# Patient Record
Sex: FEMALE | Race: WHITE | NOT HISPANIC OR LATINO
[De-identification: names, ages, dates, MRNs, and addresses within clinical notes are randomized per-mention and may not be internally consistent; named-entity substitution may affect disease eponyms.]

---

## 2019-01-09 PROBLEM — Z00.00 ENCOUNTER FOR PREVENTIVE HEALTH EXAMINATION: Status: ACTIVE | Noted: 2019-01-09

## 2019-02-01 ENCOUNTER — RECORD ABSTRACTING (OUTPATIENT)
Age: 84
End: 2019-02-01

## 2019-02-01 DIAGNOSIS — Z87.81 PERSONAL HISTORY OF (HEALED) TRAUMATIC FRACTURE: ICD-10-CM

## 2019-02-01 DIAGNOSIS — Z86.19 PERSONAL HISTORY OF OTHER INFECTIOUS AND PARASITIC DISEASES: ICD-10-CM

## 2019-02-08 ENCOUNTER — APPOINTMENT (OUTPATIENT)
Dept: GERIATRICS | Facility: HOME HEALTH | Age: 84
End: 2019-02-08
Payer: MEDICARE

## 2019-02-08 VITALS
SYSTOLIC BLOOD PRESSURE: 140 MMHG | BODY MASS INDEX: 25.66 KG/M2 | HEIGHT: 65 IN | DIASTOLIC BLOOD PRESSURE: 80 MMHG | WEIGHT: 154 LBS | HEART RATE: 85 BPM | OXYGEN SATURATION: 100 %

## 2019-02-08 DIAGNOSIS — M19.049 PRIMARY OSTEOARTHRITIS, UNSPECIFIED HAND: ICD-10-CM

## 2019-02-08 PROCEDURE — 99349 HOME/RES VST EST MOD MDM 40: CPT

## 2019-02-08 NOTE — CURRENT MEDS
[Medication Reconciliation Completed] : Medication reconciliation completed [Reports Changes In Medication (including OTC)] : Patient reports no changes in medication [] : does not miss any medication doses

## 2019-02-08 NOTE — PHYSICAL EXAM
[General Appearance - Alert] : alert [General Appearance - In No Acute Distress] : in no acute distress [General Appearance - Well Nourished] : well nourished [General Appearance - Well Developed] : well developed [General Appearance - Well-Appearing] : healthy appearing [] : normal voice and communication [Sclera] : the sclera and conjunctiva were normal [PERRL With Normal Accommodation] : pupils were equal in size, round, and reactive to light [Extraocular Movements] : extraocular movements were intact [Neck Appearance] : the appearance of the neck was normal [Heart Sounds] : normal S1 and S2 [Heart Sounds Gallop] : no gallops [Murmurs] : no murmurs [Heart Sounds Pericardial Friction Rub] : no pericardial rub [Abnormal Walk] : normal gait [Nail Clubbing] : no clubbing  or cyanosis of the fingernails [Musculoskeletal - Swelling] : no joint swelling seen [Motor Tone] : muscle strength and tone were normal [Sensation] : the sensory exam was normal to light touch and pinprick [No Focal Deficits] : no focal deficits [FreeTextEntry1] : oa hands

## 2019-02-08 NOTE — REVIEW OF SYSTEMS
[As Noted in HPI] : as noted in HPI [Negative] : Psychiatric [FreeTextEntry6] : short of breath on stairs

## 2019-04-05 ENCOUNTER — APPOINTMENT (OUTPATIENT)
Dept: GERIATRICS | Facility: HOME HEALTH | Age: 84
End: 2019-04-05
Payer: MEDICARE

## 2019-05-31 ENCOUNTER — APPOINTMENT (OUTPATIENT)
Dept: GERIATRICS | Facility: HOME HEALTH | Age: 84
End: 2019-05-31
Payer: MEDICARE

## 2019-05-31 VITALS
HEART RATE: 80 BPM | BODY MASS INDEX: 25.73 KG/M2 | WEIGHT: 154.6 LBS | SYSTOLIC BLOOD PRESSURE: 130 MMHG | DIASTOLIC BLOOD PRESSURE: 80 MMHG | OXYGEN SATURATION: 100 %

## 2019-05-31 PROCEDURE — 99348 HOME/RES VST EST LOW MDM 30: CPT

## 2019-05-31 NOTE — HISTORY OF PRESENT ILLNESS
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\par afib, htn\par varicose veins\par c/o decreased hearing\par \par 5/31/19\par f/up\par had labs 3/2019 wnl

## 2019-05-31 NOTE — ASSESSMENT
[FreeTextEntry1] : pt with htn, afib\par to get hearing checked\par doing well\par \par 5/31/19\par no changes made

## 2019-05-31 NOTE — REVIEW OF SYSTEMS
[Negative] : Endocrine [FreeTextEntry5] : varicose veins hurt at times [FreeTextEntry8] : frequent urination

## 2019-07-26 ENCOUNTER — APPOINTMENT (OUTPATIENT)
Dept: GERIATRICS | Facility: HOME HEALTH | Age: 84
End: 2019-07-26
Payer: MEDICARE

## 2019-07-26 VITALS — BODY MASS INDEX: 25.53 KG/M2 | WEIGHT: 153.4 LBS | HEART RATE: 95 BPM | OXYGEN SATURATION: 98 %

## 2019-07-26 VITALS — DIASTOLIC BLOOD PRESSURE: 90 MMHG | SYSTOLIC BLOOD PRESSURE: 145 MMHG

## 2019-07-26 PROCEDURE — 99348 HOME/RES VST EST LOW MDM 30: CPT

## 2019-07-26 NOTE — PHYSICAL EXAM
[General Appearance - Alert] : alert [General Appearance - In No Acute Distress] : in no acute distress [General Appearance - Well Nourished] : well nourished [General Appearance - Well Developed] : well developed [General Appearance - Well-Appearing] : healthy appearing [Sclera] : the sclera and conjunctiva were normal [PERRL With Normal Accommodation] : pupils were equal in size, round, and reactive to light [Extraocular Movements] : extraocular movements were intact [] : no respiratory distress [Respiration, Rhythm And Depth] : normal respiratory rhythm and effort [Exaggerated Use Of Accessory Muscles For Inspiration] : no accessory muscle use [Auscultation Breath Sounds / Voice Sounds] : lungs were clear to auscultation bilaterally [Heart Sounds] : normal S1 and S2 [Heart Sounds Gallop] : no gallops [Murmurs] : no murmurs [Heart Sounds Pericardial Friction Rub] : no pericardial rub [Abdomen Soft] : soft [Abdomen Tenderness] : non-tender [Abdomen Mass (___ Cm)] : no abdominal mass palpated [No CVA Tenderness] : no ~M costovertebral angle tenderness [No Spinal Tenderness] : no spinal tenderness [Abnormal Walk] : normal gait [Musculoskeletal - Swelling] : no joint swelling seen [Nail Clubbing] : no clubbing  or cyanosis of the fingernails [Motor Tone] : muscle strength and tone were normal [FreeTextEntry1] : oa hands [Skin Color & Pigmentation] : normal skin color and pigmentation [No Focal Deficits] : no focal deficits

## 2019-07-26 NOTE — HISTORY OF PRESENT ILLNESS
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\par afib, htn\par varicose veins\par c/o decreased hearing\par \par 5/31/19\par f/up\par had labs 3/2019 wnl\par \par 7/26/19\par pt is doing well\par c/o occas jabbing pains in rt thigh\par has varicose veins\par needs all med renewed

## 2019-07-26 NOTE — REVIEW OF SYSTEMS
[Arthralgias] : arthralgias [Loss Of Hearing] : hearing loss [Negative] : Neurological [FreeTextEntry1] : jabbing pains in rt thigh

## 2019-07-26 NOTE — ASSESSMENT
[FreeTextEntry1] : pt with htn, afib\par to get hearing checked\par doing well\par \par 5/31/19\par no changes made\par \par 7/26/19\par no changes made

## 2019-09-18 NOTE — PHYSICAL EXAM
[General Appearance - Alert] : alert [General Appearance - In No Acute Distress] : in no acute distress [General Appearance - Well Nourished] : well nourished [General Appearance - Well Developed] : well developed [General Appearance - Well-Appearing] : healthy appearing Pauline Faith [] : normal voice and communication [Sclera] : the sclera and conjunctiva were normal [PERRL With Normal Accommodation] : pupils were equal in size, round, and reactive to light [Extraocular Movements] : extraocular movements were intact [Neck Appearance] : the appearance of the neck was normal [Heart Sounds] : normal S1 and S2 [Heart Sounds Gallop] : no gallops [Murmurs] : no murmurs [Heart Sounds Pericardial Friction Rub] : no pericardial rub [Abdomen Soft] : soft [Abdomen Tenderness] : non-tender [Abdomen Mass (___ Cm)] : no abdominal mass palpated [No CVA Tenderness] : no ~M costovertebral angle tenderness [No Spinal Tenderness] : no spinal tenderness [Abnormal Walk] : normal gait [Nail Clubbing] : no clubbing  or cyanosis of the fingernails [Musculoskeletal - Swelling] : no joint swelling seen [Motor Tone] : muscle strength and tone were normal [FreeTextEntry1] : oa hands [No Focal Deficits] : no focal deficits

## 2019-09-27 ENCOUNTER — APPOINTMENT (OUTPATIENT)
Dept: GERIATRICS | Facility: HOME HEALTH | Age: 84
End: 2019-09-27
Payer: MEDICARE

## 2019-09-27 VITALS
OXYGEN SATURATION: 100 % | SYSTOLIC BLOOD PRESSURE: 140 MMHG | BODY MASS INDEX: 25.16 KG/M2 | DIASTOLIC BLOOD PRESSURE: 100 MMHG | WEIGHT: 151.2 LBS | HEART RATE: 90 BPM

## 2019-09-27 PROCEDURE — 99348 HOME/RES VST EST LOW MDM 30: CPT

## 2019-09-27 NOTE — ASSESSMENT
[FreeTextEntry1] : pt with htn, afib\par to get hearing checked\par doing well\par \par 5/31/19\par no changes made\par \par 7/26/19\par no changes made\par \par 9/27/19\par reviewed proxy\par flu vaccine given\par restat extra 25 mg toprol\par hr

## 2019-09-27 NOTE — REVIEW OF SYSTEMS
[Palpitations] : palpitations [Arthralgias] : arthralgias [Difficulty Walking] : difficulty walking [Limb Pain] : limb pain [Negative] : Heme/Lymph [FreeTextEntry9] : rt thigh [de-identified] : rollator

## 2019-09-27 NOTE — PHYSICAL EXAM
[General Appearance - Alert] : alert [General Appearance - In No Acute Distress] : in no acute distress [General Appearance - Well Nourished] : well nourished [General Appearance - Well Developed] : well developed [General Appearance - Well-Appearing] : healthy appearing [Sclera] : the sclera and conjunctiva were normal [PERRL With Normal Accommodation] : pupils were equal in size, round, and reactive to light [Extraocular Movements] : extraocular movements were intact [Neck Appearance] : the appearance of the neck was normal [] : no respiratory distress [Respiration, Rhythm And Depth] : normal respiratory rhythm and effort [Auscultation Breath Sounds / Voice Sounds] : lungs were clear to auscultation bilaterally [Exaggerated Use Of Accessory Muscles For Inspiration] : no accessory muscle use [Heart Sounds] : normal S1 and S2 [Heart Sounds Gallop] : no gallops [Murmurs] : no murmurs [Heart Sounds Pericardial Friction Rub] : no pericardial rub [Abdomen Soft] : soft [Abdomen Tenderness] : non-tender [Abdomen Mass (___ Cm)] : no abdominal mass palpated [No CVA Tenderness] : no ~M costovertebral angle tenderness [No Spinal Tenderness] : no spinal tenderness [Abnormal Walk] : normal gait [Musculoskeletal - Swelling] : no joint swelling seen [Nail Clubbing] : no clubbing  or cyanosis of the fingernails [Motor Tone] : muscle strength and tone were normal [Skin Color & Pigmentation] : normal skin color and pigmentation [FreeTextEntry1] : oa hands [No Focal Deficits] : no focal deficits

## 2019-09-27 NOTE — HISTORY OF PRESENT ILLNESS
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\par afib, htn\par varicose veins\par c/o decreased hearing\par \par 5/31/19\par f/up\par had labs 3/2019 wnl\par \par 7/26/19\par pt is doing well\par c/o occas jabbing pains in rt thigh\par has varicose veins\par needs all med renewed\par \par 9/27/19\par occas jabs rt thigh\par varicose veins\par not takin toprol xl 25 - ran out\par palpitations if sh walks too fast\par needs flu v\par supplied health care proxy - discussed advance directives

## 2019-11-02 ENCOUNTER — OTHER (OUTPATIENT)
Age: 84
End: 2019-11-02

## 2019-11-15 ENCOUNTER — APPOINTMENT (OUTPATIENT)
Dept: GERIATRICS | Facility: HOME HEALTH | Age: 84
End: 2019-11-15
Payer: MEDICARE

## 2019-11-15 PROCEDURE — 99348 HOME/RES VST EST LOW MDM 30: CPT

## 2019-11-17 VITALS
WEIGHT: 151.4 LBS | DIASTOLIC BLOOD PRESSURE: 80 MMHG | SYSTOLIC BLOOD PRESSURE: 150 MMHG | OXYGEN SATURATION: 99 % | BODY MASS INDEX: 25.19 KG/M2 | HEART RATE: 80 BPM

## 2019-11-17 NOTE — REVIEW OF SYSTEMS
[SOB on Exertion] : shortness of breath during exertion [Arthralgias] : arthralgias [Difficulty Walking] : difficulty walking [Negative] : Heme/Lymph [Shortness Of Breath] : no shortness of breath [de-identified] : transient pinching pains in different places on legs

## 2019-11-17 NOTE — ASSESSMENT
[FreeTextEntry1] : pt with htn, afib\par to get hearing checked\par doing well\par \par 5/31/19\par no changes made\par \par 7/26/19\par no changes made\par \par 9/27/19\par reviewed proxy\par flu vaccine given\par restat extra 25 mg toprol\par hr \par \par 11/151/9\par pt seems to have pain  in varicose veins - irregular patterns\par suggested vascular consult\par a fib\par mild sob on exertion\par wearing supp hose

## 2019-11-17 NOTE — HISTORY OF PRESENT ILLNESS
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\par afib, htn\par varicose veins\par c/o decreased hearing\par \par 5/31/19\par f/up\par had labs 3/2019 wnl\par \par 7/26/19\par pt is doing well\par c/o occas jabbing pains in rt thigh\par has varicose veins\par needs all med renewed\par \par 9/27/19\par occas jabs rt thigh\par varicose veins\par not takin toprol xl 25 - ran out\par palpitations if sh walks too fast\par needs flu v\par supplied health care proxy - discussed advance directives\par \par 11/15/19\par pt c/o pinching pains in legs - shania when on his feet\par does not want pain meds\par has some shortness of breath on walking\par does wear supp hose

## 2019-11-17 NOTE — PHYSICAL EXAM
[General Appearance - Alert] : alert [General Appearance - In No Acute Distress] : in no acute distress [General Appearance - Well Nourished] : well nourished [General Appearance - Well Developed] : well developed [General Appearance - Well-Appearing] : healthy appearing [Sclera] : the sclera and conjunctiva were normal [PERRL With Normal Accommodation] : pupils were equal in size, round, and reactive to light [Extraocular Movements] : extraocular movements were intact [Outer Ear] : the ears and nose were normal in appearance [Hearing Threshold Finger Rub Not [place]] : hearing was normal [Examination Of The Oral Cavity] : the lips and gums were normal [Neck Appearance] : the appearance of the neck was normal [] : no respiratory distress [Respiration, Rhythm And Depth] : normal respiratory rhythm and effort [Exaggerated Use Of Accessory Muscles For Inspiration] : no accessory muscle use [Auscultation Breath Sounds / Voice Sounds] : lungs were clear to auscultation bilaterally [Heart Sounds] : normal S1 and S2 [Heart Sounds Gallop] : no gallops [Murmurs] : no murmurs [Heart Sounds Pericardial Friction Rub] : no pericardial rub [Abdomen Soft] : soft [Abdomen Tenderness] : non-tender [Abdomen Mass (___ Cm)] : no abdominal mass palpated [Cervical Lymph Nodes Enlarged Posterior Bilaterally] : posterior cervical [Cervical Lymph Nodes Enlarged Anterior Bilaterally] : anterior cervical [No CVA Tenderness] : no ~M costovertebral angle tenderness [No Spinal Tenderness] : no spinal tenderness [Abnormal Walk] : normal gait [Nail Clubbing] : no clubbing  or cyanosis of the fingernails [Involuntary Movements] : no involuntary movements were seen [Musculoskeletal - Swelling] : no joint swelling seen [Motor Tone] : muscle strength and tone were normal [FreeTextEntry1] : oa hands [Skin Color & Pigmentation] : normal skin color and pigmentation [No Focal Deficits] : no focal deficits

## 2020-01-10 ENCOUNTER — APPOINTMENT (OUTPATIENT)
Dept: GERIATRICS | Facility: HOME HEALTH | Age: 85
End: 2020-01-10
Payer: MEDICARE

## 2020-01-10 VITALS
HEART RATE: 72 BPM | BODY MASS INDEX: 25.29 KG/M2 | OXYGEN SATURATION: 95 % | WEIGHT: 152 LBS | SYSTOLIC BLOOD PRESSURE: 135 MMHG | DIASTOLIC BLOOD PRESSURE: 80 MMHG

## 2020-01-10 PROCEDURE — 99348 HOME/RES VST EST LOW MDM 30: CPT

## 2020-01-10 NOTE — PHYSICAL EXAM
[General Appearance - Alert] : alert [General Appearance - In No Acute Distress] : in no acute distress [General Appearance - Well Nourished] : well nourished [General Appearance - Well-Appearing] : healthy appearing [General Appearance - Well Developed] : well developed [] : normal voice and communication [PERRL With Normal Accommodation] : pupils were equal in size, round, and reactive to light [Sclera] : the sclera and conjunctiva were normal [Extraocular Movements] : extraocular movements were intact [Normal Oral Mucosa] : normal oral mucosa [No Oral Pallor] : no oral pallor [No Oral Cyanosis] : no oral cyanosis [Outer Ear] : the ears and nose were normal in appearance [Hearing Threshold Finger Rub Not [place]] : hearing was normal [Examination Of The Oral Cavity] : the lips and gums were normal [Neck Appearance] : the appearance of the neck was normal [Heart Sounds] : normal S1 and S2 [Heart Sounds Gallop] : no gallops [Murmurs] : no murmurs [Heart Sounds Pericardial Friction Rub] : no pericardial rub [Abdomen Soft] : soft [Abdomen Tenderness] : non-tender [Abdomen Mass (___ Cm)] : no abdominal mass palpated [Cervical Lymph Nodes Enlarged Posterior Bilaterally] : posterior cervical [Cervical Lymph Nodes Enlarged Anterior Bilaterally] : anterior cervical [No CVA Tenderness] : no ~M costovertebral angle tenderness [No Spinal Tenderness] : no spinal tenderness [Abnormal Walk] : normal gait [Nail Clubbing] : no clubbing  or cyanosis of the fingernails [Involuntary Movements] : no involuntary movements were seen [Musculoskeletal - Swelling] : no joint swelling seen [Motor Tone] : muscle strength and tone were normal [FreeTextEntry1] : oa hands [Skin Color & Pigmentation] : normal skin color and pigmentation [No Focal Deficits] : no focal deficits [Oriented To Time, Place, And Person] : oriented to person, place, and time [Impaired Insight] : insight and judgment were intact [Affect] : the affect was normal [Mood] : the mood was normal [Memory Recent] : recent memory was not impaired [Memory Remote] : remote memory was not impaired

## 2020-01-10 NOTE — DATA REVIEWED
[FreeTextEntry1] : pt is the middle child of 13 children - mother had children from 18 to 45\par 6 eldeste have passed away\par sister is with her at [facility]

## 2020-01-10 NOTE — SOCIAL HISTORY
[Fully functional (bathing, dressing, toileting, transferring, walking, feeding)] : Fully functional (bathing, dressing, toileting, transferring, walking, feeding) [No falls in past year] : Patient reported no falls in the past year [Fully functional (using the telephone, shopping, preparing meals, housekeeping, doing laundry, using transportation,] : Fully functional and needs no help or supervision to perform IADLs (using the telephone, shopping, preparing meals, housekeeping, doing laundry, using transportation, managing medications and managing finances) [Walker] : walker [FreeTextEntry1] : [facility]

## 2020-01-10 NOTE — ASSESSMENT
[FreeTextEntry1] : pt with htn, afib\par to get hearing checked\par doing well\par \par 5/31/19\par no changes made\par \par 7/26/19\par no changes made\par \par 9/27/19\par reviewed proxy\par flu vaccine given\par restat extra 25 mg toprol\par hr \par \par 11/151/9\par pt seems to have pain  in varicose veins - irregular patterns\par suggested vascular consult\par a fib\par mild sob on exertion\par wearing supp hose\par \par 1/10/20\par decided against vascular consult\par a fib controlled\par BP controlled

## 2020-01-10 NOTE — REVIEW OF SYSTEMS
[Loss Of Hearing] : hearing loss [Arthralgias] : arthralgias [Difficulty Walking] : difficulty walking [Negative] : Cardiovascular [de-identified] : walker - rayashwini fingers [FreeTextEntry1] : jabs in rt thigh - varicose veins

## 2020-01-10 NOTE — HISTORY OF PRESENT ILLNESS
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\par afib, htn\par varicose veins\par c/o decreased hearing\par \par 5/31/19\par f/up\par had labs 3/2019 wnl\par \par 7/26/19\par pt is doing well\par c/o occas jabbing pains in rt thigh\par has varicose veins\par needs all med renewed\par \par 9/27/19\par occas jabs rt thigh\par varicose veins\par not takin toprol xl 25 - ran out\par palpitations if sh walks too fast\par needs flu v\par supplied health care proxy - discussed advance directives\par \par 11/15/19\par pt c/o pinching pains in legs - shania when on his feet\par does not want pain meds\par has some shortness of breath on walking\par does wear supp hose\par \par 1/10/20\par pt continues to have occas pains in thighs  as above\par did not go to vascular - not that troublesome\par wears supp hose\par has varicose veins\par has afib - no palpitations\par no chest pain, no sob

## 2020-02-28 ENCOUNTER — APPOINTMENT (OUTPATIENT)
Dept: GERIATRICS | Facility: HOME HEALTH | Age: 85
End: 2020-02-28
Payer: MEDICARE

## 2020-02-28 PROCEDURE — 99348 HOME/RES VST EST LOW MDM 30: CPT

## 2020-03-01 VITALS
BODY MASS INDEX: 25.73 KG/M2 | SYSTOLIC BLOOD PRESSURE: 140 MMHG | WEIGHT: 154.6 LBS | HEART RATE: 94 BPM | DIASTOLIC BLOOD PRESSURE: 80 MMHG | OXYGEN SATURATION: 98 %

## 2020-03-01 NOTE — PHYSICAL EXAM
[General Appearance - Alert] : alert [General Appearance - In No Acute Distress] : in no acute distress [General Appearance - Well Nourished] : well nourished [General Appearance - Well Developed] : well developed [General Appearance - Well-Appearing] : healthy appearing [Sclera] : the sclera and conjunctiva were normal [PERRL With Normal Accommodation] : pupils were equal in size, round, and reactive to light [Extraocular Movements] : extraocular movements were intact [No Oral Pallor] : no oral pallor [Normal Oral Mucosa] : normal oral mucosa [No Oral Cyanosis] : no oral cyanosis [Hearing Threshold Finger Rub Not [place]] : hearing was normal [Outer Ear] : the ears and nose were normal in appearance [Neck Appearance] : the appearance of the neck was normal [Examination Of The Oral Cavity] : the lips and gums were normal [Respiration, Rhythm And Depth] : normal respiratory rhythm and effort [] : no respiratory distress [Exaggerated Use Of Accessory Muscles For Inspiration] : no accessory muscle use [Heart Sounds] : normal S1 and S2 [Auscultation Breath Sounds / Voice Sounds] : lungs were clear to auscultation bilaterally [Heart Sounds Gallop] : no gallops [Murmurs] : no murmurs [Heart Sounds Pericardial Friction Rub] : no pericardial rub [Abdomen Tenderness] : non-tender [Abdomen Soft] : soft [Cervical Lymph Nodes Enlarged Posterior Bilaterally] : posterior cervical [Cervical Lymph Nodes Enlarged Anterior Bilaterally] : anterior cervical [Abdomen Mass (___ Cm)] : no abdominal mass palpated [No CVA Tenderness] : no ~M costovertebral angle tenderness [No Spinal Tenderness] : no spinal tenderness [Nail Clubbing] : no clubbing  or cyanosis of the fingernails [Abnormal Walk] : normal gait [Involuntary Movements] : no involuntary movements were seen [Musculoskeletal - Swelling] : no joint swelling seen [FreeTextEntry1] : oa hands [Motor Tone] : muscle strength and tone were normal [Skin Color & Pigmentation] : normal skin color and pigmentation [No Focal Deficits] : no focal deficits [Oriented To Time, Place, And Person] : oriented to person, place, and time [Impaired Insight] : insight and judgment were intact [Affect] : the affect was normal [Memory Recent] : recent memory was not impaired [Memory Remote] : remote memory was not impaired [Mood] : the mood was normal

## 2020-03-01 NOTE — HISTORY OF PRESENT ILLNESS
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\par afib, htn\par varicose veins\par c/o decreased hearing\par \par 5/31/19\par f/up\par had labs 3/2019 wnl\par \par 7/26/19\par pt is doing well\par c/o occas jabbing pains in rt thigh\par has varicose veins\par needs all med renewed\par \par 9/27/19\par occas jabs rt thigh\par varicose veins\par not takin toprol xl 25 - ran out\par palpitations if sh walks too fast\par needs flu v\par supplied health care proxy - discussed advance directives\par \par 11/15/19\par pt c/o pinching pains in legs - shania when on his feet\par does not want pain meds\par has some shortness of breath on walking\par does wear supp hose\par \par 1/10/20\par pt continues to have occas pains in thighs  as above\par did not go to vascular - not that troublesome\par wears supp hose\par has varicose veins\par has afib - no palpitations\par no chest pain, no sob\par \par 2/28/20\par pt has been doing well\par discussed advanced directives\par pt decided on home DNR\par no chest pain\par no sob\par no palps

## 2020-03-01 NOTE — ASSESSMENT
[FreeTextEntry1] : pt with htn, afib\par to get hearing checked\par doing well\par \par 5/31/19\par no changes made\par \par 7/26/19\par no changes made\par \par 9/27/19\par reviewed proxy\par flu vaccine given\par restat extra 25 mg toprol\par hr \par \par 11/151/9\par pt seems to have pain  in varicose veins - irregular patterns\par suggested vascular consult\par a fib\par mild sob on exertion\par wearing supp hose\par \par 1/10/20\par decided against vascular consult\par a fib controlled\par BP controlled\par \par 2/28/20\par signed home DNR\par renewed all meds

## 2020-04-23 ENCOUNTER — APPOINTMENT (OUTPATIENT)
Dept: GERIATRICS | Facility: HOME HEALTH | Age: 85
End: 2020-04-23
Payer: MEDICARE

## 2020-04-23 VITALS
HEART RATE: 83 BPM | BODY MASS INDEX: 25.63 KG/M2 | DIASTOLIC BLOOD PRESSURE: 80 MMHG | WEIGHT: 154 LBS | OXYGEN SATURATION: 100 % | SYSTOLIC BLOOD PRESSURE: 140 MMHG

## 2020-04-23 PROCEDURE — 99348 HOME/RES VST EST LOW MDM 30: CPT

## 2020-04-23 NOTE — REVIEW OF SYSTEMS
[Loss Of Hearing] : hearing loss [Heart Rate Is Fast] : fast heart rate [Arthralgias] : arthralgias [Negative] : Heme/Lymph

## 2020-04-23 NOTE — PHYSICAL EXAM
[General Appearance - Alert] : alert [General Appearance - In No Acute Distress] : in no acute distress [General Appearance - Well Nourished] : well nourished [General Appearance - Well Developed] : well developed [General Appearance - Well-Appearing] : healthy appearing [Sclera] : the sclera and conjunctiva were normal [PERRL With Normal Accommodation] : pupils were equal in size, round, and reactive to light [Extraocular Movements] : extraocular movements were intact [Normal Oral Mucosa] : normal oral mucosa [No Oral Pallor] : no oral pallor [No Oral Cyanosis] : no oral cyanosis [Outer Ear] : the ears and nose were normal in appearance [Hearing Threshold Finger Rub Not [place]] : hearing was normal [Examination Of The Oral Cavity] : the lips and gums were normal [Neck Appearance] : the appearance of the neck was normal [] : no respiratory distress [Respiration, Rhythm And Depth] : normal respiratory rhythm and effort [Exaggerated Use Of Accessory Muscles For Inspiration] : no accessory muscle use [Auscultation Breath Sounds / Voice Sounds] : lungs were clear to auscultation bilaterally [Heart Sounds] : normal S1 and S2 [Heart Sounds Gallop] : no gallops [Murmurs] : no murmurs [Heart Sounds Pericardial Friction Rub] : no pericardial rub [No CVA Tenderness] : no ~M costovertebral angle tenderness [No Spinal Tenderness] : no spinal tenderness [Abnormal Walk] : normal gait [Nail Clubbing] : no clubbing  or cyanosis of the fingernails [Involuntary Movements] : no involuntary movements were seen [Musculoskeletal - Swelling] : no joint swelling seen [Motor Tone] : muscle strength and tone were normal [FreeTextEntry1] : oa hands, uses walker [Skin Color & Pigmentation] : normal skin color and pigmentation [No Focal Deficits] : no focal deficits [Oriented To Time, Place, And Person] : oriented to person, place, and time [Impaired Insight] : insight and judgment were intact [Affect] : the affect was normal [Mood] : the mood was normal [Memory Recent] : recent memory was not impaired [Memory Remote] : remote memory was not impaired

## 2020-04-23 NOTE — ASSESSMENT
[FreeTextEntry1] : pt with htn, afib\par to get hearing checked\par doing well\par \par 5/31/19\par no changes made\par \par 7/26/19\par no changes made\par \par 9/27/19\par reviewed proxy\par flu vaccine given\par restat extra 25 mg toprol\par hr \par \par 11/151/9\par pt seems to have pain  in varicose veins - irregular patterns\par suggested vascular consult\par a fib\par mild sob on exertion\par wearing supp hose\par \par 1/10/20\par decided against vascular consult\par a fib controlled\par BP controlled\par \par 2/28/20\par signed home DNR\par renewed all meds\par \par 4/23/20\par pt is doing well\par no changes made

## 2020-04-23 NOTE — HISTORY OF PRESENT ILLNESS
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\par afib, htn\par varicose veins\par c/o decreased hearing\par \par 5/31/19\par f/up\par had labs 3/2019 wnl\par \par 7/26/19\par pt is doing well\par c/o occas jabbing pains in rt thigh\par has varicose veins\par needs all med renewed\par \par 9/27/19\par occas jabs rt thigh\par varicose veins\par not takin toprol xl 25 - ran out\par palpitations if sh walks too fast\par needs flu v\par supplied health care proxy - discussed advance directives\par \par 11/15/19\par pt c/o pinching pains in legs - shania when on his feet\par does not want pain meds\par has some shortness of breath on walking\par does wear supp hose\par \par 1/10/20\par pt continues to have occas pains in thighs  as above\par did not go to vascular - not that troublesome\par wears supp hose\par has varicose veins\par has afib - no palpitations\par no chest pain, no sob\par \par 2/28/20\par pt has been doing well\par discussed advanced directives\par pt decided on home DNR\par no chest pain\par no sob\par no palps\par \par 4/23/20\par pt is now dnr and wears a bracelet\par she is feeling well\par and is independent\par occas papitations\par sticking feeling in rt thigh unchanged\par reviewed meds

## 2020-04-24 ENCOUNTER — APPOINTMENT (OUTPATIENT)
Dept: GERIATRICS | Facility: HOME HEALTH | Age: 85
End: 2020-04-24

## 2020-05-20 ENCOUNTER — APPOINTMENT (OUTPATIENT)
Dept: GERIATRICS | Facility: HOME HEALTH | Age: 85
End: 2020-05-20
Payer: MEDICARE

## 2020-05-20 VITALS
WEIGHT: 154.2 LBS | DIASTOLIC BLOOD PRESSURE: 90 MMHG | SYSTOLIC BLOOD PRESSURE: 140 MMHG | BODY MASS INDEX: 25.66 KG/M2 | OXYGEN SATURATION: 100 % | HEART RATE: 84 BPM

## 2020-05-20 PROCEDURE — 99348 HOME/RES VST EST LOW MDM 30: CPT

## 2020-05-20 NOTE — ASSESSMENT
[FreeTextEntry1] : pt with htn, afib\par to get hearing checked\par doing well\par \par 5/31/19\par no changes made\par \par 7/26/19\par no changes made\par \par 9/27/19\par reviewed proxy\par flu vaccine given\par restat extra 25 mg toprol\par hr \par \par 11/151/9\par pt seems to have pain  in varicose veins - irregular patterns\par suggested vascular consult\par a fib\par mild sob on exertion\par wearing supp hose\par \par 1/10/20\par decided against vascular consult\par a fib controlled\par BP controlled\par \par 2/28/20\par signed home DNR\par renewed all meds\par \par 4/23/20\par pt is doing well\par no changes made\par \par 5/20/20\par resigned DNR\par doing well\par no changes in meds

## 2020-05-20 NOTE — PHYSICAL EXAM
[General Appearance - Alert] : alert [General Appearance - In No Acute Distress] : in no acute distress [General Appearance - Well Nourished] : well nourished [General Appearance - Well Developed] : well developed [General Appearance - Well-Appearing] : healthy appearing [] : normal voice and communication [Sclera] : the sclera and conjunctiva were normal [PERRL With Normal Accommodation] : pupils were equal in size, round, and reactive to light [Extraocular Movements] : extraocular movements were intact [Normal Oral Mucosa] : normal oral mucosa [No Oral Pallor] : no oral pallor [No Oral Cyanosis] : no oral cyanosis [Outer Ear] : the ears and nose were normal in appearance [Hearing Threshold Finger Rub Not [place]] : hearing was normal [Examination Of The Oral Cavity] : the lips and gums were normal [Cervical Lymph Nodes Enlarged Posterior Bilaterally] : posterior cervical [Cervical Lymph Nodes Enlarged Anterior Bilaterally] : anterior cervical [No CVA Tenderness] : no ~M costovertebral angle tenderness [No Spinal Tenderness] : no spinal tenderness [Abnormal Walk] : normal gait [Nail Clubbing] : no clubbing  or cyanosis of the fingernails [Involuntary Movements] : no involuntary movements were seen [Musculoskeletal - Swelling] : no joint swelling seen [Motor Tone] : muscle strength and tone were normal [Skin Color & Pigmentation] : normal skin color and pigmentation [No Focal Deficits] : no focal deficits [Oriented To Time, Place, And Person] : oriented to person, place, and time [Impaired Insight] : insight and judgment were intact [Affect] : the affect was normal [Mood] : the mood was normal [Memory Recent] : recent memory was not impaired [Memory Remote] : remote memory was not impaired [FreeTextEntry1] : oa hands, uses walker

## 2020-05-20 NOTE — HISTORY OF PRESENT ILLNESS
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\par afib, htn\par varicose veins\par c/o decreased hearing\par \par 5/31/19\par f/up\par had labs 3/2019 wnl\par \par 7/26/19\par pt is doing well\par c/o occas jabbing pains in rt thigh\par has varicose veins\par needs all med renewed\par \par 9/27/19\par occas jabs rt thigh\par varicose veins\par not takin toprol xl 25 - ran out\par palpitations if sh walks too fast\par needs flu v\par supplied health care proxy - discussed advance directives\par \par 11/15/19\par pt c/o pinching pains in legs - shania when on his feet\par does not want pain meds\par has some shortness of breath on walking\par does wear supp hose\par \par 1/10/20\par pt continues to have occas pains in thighs  as above\par did not go to vascular - not that troublesome\par wears supp hose\par has varicose veins\par has afib - no palpitations\par no chest pain, no sob\par \par 2/28/20\par pt has been doing well\par discussed advanced directives\par pt decided on home DNR\par no chest pain\par no sob\par no palps\par \par 4/23/20\par pt is now dnr and wears a bracelet\par she is feeling well\par and is independent\par occas papitations\par sticking feeling in rt thigh unchanged\par reviewed meds\par \par 5/20/20\par pt is well\par rare palpitatons\par occas sticking feeling in thigh\par varicose veins\par reviewed meds and purpose of each

## 2020-06-29 ENCOUNTER — RX RENEWAL (OUTPATIENT)
Age: 85
End: 2020-06-29

## 2020-07-16 ENCOUNTER — APPOINTMENT (OUTPATIENT)
Dept: GERIATRICS | Facility: HOME HEALTH | Age: 85
End: 2020-07-16
Payer: MEDICARE

## 2020-07-16 PROCEDURE — 99348 HOME/RES VST EST LOW MDM 30: CPT | Mod: 25

## 2020-07-16 PROCEDURE — 69210 REMOVE IMPACTED EAR WAX UNI: CPT

## 2020-07-17 VITALS
DIASTOLIC BLOOD PRESSURE: 80 MMHG | WEIGHT: 150.8 LBS | BODY MASS INDEX: 25.09 KG/M2 | TEMPERATURE: 96.9 F | OXYGEN SATURATION: 100 % | HEART RATE: 81 BPM | SYSTOLIC BLOOD PRESSURE: 135 MMHG

## 2020-07-17 NOTE — REVIEW OF SYSTEMS
[Loss Of Hearing] : hearing loss [Palpitations] : palpitations [Lower Ext Edema] : lower extremity edema [Negative] : Psychiatric [Chest Pain] : no chest pain [Shortness Of Breath] : no shortness of breath [Wheezing] : no wheezing [SOB on Exertion] : no shortness of breath during exertion [Orthopnea] : no orthopnea [PND] : no PND

## 2020-07-17 NOTE — PHYSICAL EXAM
[General Appearance - Alert] : alert [General Appearance - In No Acute Distress] : in no acute distress [General Appearance - Well Nourished] : well nourished [General Appearance - Well Developed] : well developed [General Appearance - Well-Appearing] : healthy appearing [Sclera] : the sclera and conjunctiva were normal [PERRL With Normal Accommodation] : pupils were equal in size, round, and reactive to light [Extraocular Movements] : extraocular movements were intact [Normal Oral Mucosa] : normal oral mucosa [No Oral Pallor] : no oral pallor [No Oral Cyanosis] : no oral cyanosis [Outer Ear] : the ears and nose were normal in appearance [Hearing Threshold Finger Rub Not [place]] : hearing was normal [Examination Of The Oral Cavity] : the lips and gums were normal [] : no respiratory distress [Respiration, Rhythm And Depth] : normal respiratory rhythm and effort [Exaggerated Use Of Accessory Muscles For Inspiration] : no accessory muscle use [Auscultation Breath Sounds / Voice Sounds] : lungs were clear to auscultation bilaterally [Heart Sounds] : normal S1 and S2 [Heart Sounds Gallop] : no gallops [Murmurs] : no murmurs [Heart Sounds Pericardial Friction Rub] : no pericardial rub [Cervical Lymph Nodes Enlarged Posterior Bilaterally] : posterior cervical [Cervical Lymph Nodes Enlarged Anterior Bilaterally] : anterior cervical [No CVA Tenderness] : no ~M costovertebral angle tenderness [No Spinal Tenderness] : no spinal tenderness [Abnormal Walk] : normal gait [Nail Clubbing] : no clubbing  or cyanosis of the fingernails [Involuntary Movements] : no involuntary movements were seen [Musculoskeletal - Swelling] : no joint swelling seen [Motor Tone] : muscle strength and tone were normal [Skin Color & Pigmentation] : normal skin color and pigmentation [FreeTextEntry1] : oa hands, uses walker [Impaired Insight] : insight and judgment were intact [Oriented To Time, Place, And Person] : oriented to person, place, and time [No Focal Deficits] : no focal deficits [Affect] : the affect was normal

## 2020-07-17 NOTE — ASSESSMENT
[FreeTextEntry1] : pt with htn, afib\par to get hearing checked\par doing well\par \par 5/31/19\par no changes made\par \par 7/26/19\par no changes made\par \par 9/27/19\par reviewed proxy\par flu vaccine given\par restat extra 25 mg toprol\par hr \par \par 11/151/9\par pt seems to have pain  in varicose veins - irregular patterns\par suggested vascular consult\par a fib\par mild sob on exertion\par wearing supp hose\par \par 1/10/20\par decided against vascular consult\par a fib controlled\par BP controlled\par \par 2/28/20\par signed home DNR\par renewed all meds\par \par 4/23/20\par pt is doing well\par no changes made\par \par 5/20/20\par resigned DNR\par \par 7/16/20\par pt is doing well\par cardiac stable\par cleaned ears\par doing well\par no changes in meds

## 2020-07-17 NOTE — HISTORY OF PRESENT ILLNESS
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\par afib, htn\par varicose veins\par c/o decreased hearing\par \par 5/31/19\par f/up\par had labs 3/2019 wnl\par \par 7/26/19\par pt is doing well\par c/o occas jabbing pains in rt thigh\par has varicose veins\par needs all med renewed\par \par 9/27/19\par occas jabs rt thigh\par varicose veins\par not takin toprol xl 25 - ran out\par palpitations if sh walks too fast\par needs flu v\par supplied health care proxy - discussed advance directives\par \par 11/15/19\par pt c/o pinching pains in legs - shania when on his feet\par does not want pain meds\par has some shortness of breath on walking\par does wear supp hose\par \par 1/10/20\par pt continues to have occas pains in thighs  as above\par did not go to vascular - not that troublesome\par wears supp hose\par has varicose veins\par has afib - no palpitations\par no chest pain, no sob\par \par 2/28/20\par pt has been doing well\par discussed advanced directives\par pt decided on home DNR\par no chest pain\par no sob\par no palps\par \par 4/23/20\par pt is now dnr and wears a bracelet\par she is feeling well\par and is independent\par occas papitations\par sticking feeling in rt thigh unchanged\par reviewed meds\par \par 5/20/20\par pt is well\par rare palpitatons\par occas sticking feeling in thigh\par varicose veins\par reviewed meds and purpose of each\par \par 7/16/20\par pt relays one evening she had palpitaitons - did not report\par otherwise well\par less appetite in summer\par edema foot\par diminished hearing

## 2020-09-03 ENCOUNTER — APPOINTMENT (OUTPATIENT)
Dept: GERIATRICS | Facility: HOME HEALTH | Age: 85
End: 2020-09-03
Payer: MEDICARE

## 2020-09-03 PROCEDURE — 99348 HOME/RES VST EST LOW MDM 30: CPT

## 2020-09-05 VITALS
BODY MASS INDEX: 25.26 KG/M2 | WEIGHT: 151.8 LBS | HEART RATE: 60 BPM | SYSTOLIC BLOOD PRESSURE: 140 MMHG | DIASTOLIC BLOOD PRESSURE: 85 MMHG | TEMPERATURE: 96.9 F | OXYGEN SATURATION: 100 %

## 2020-09-05 NOTE — HISTORY OF PRESENT ILLNESS
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\par afib, htn\par varicose veins\par c/o decreased hearing\par \par 5/31/19\par f/up\par had labs 3/2019 wnl\par \par 7/26/19\par pt is doing well\par c/o occas jabbing pains in rt thigh\par has varicose veins\par needs all med renewed\par \par 9/27/19\par occas jabs rt thigh\par varicose veins\par not takin toprol xl 25 - ran out\par palpitations if sh walks too fast\par needs flu v\par supplied health care proxy - discussed advance directives\par \par 11/15/19\par pt c/o pinching pains in legs - shania when on his feet\par does not want pain meds\par has some shortness of breath on walking\par does wear supp hose\par \par 1/10/20\par pt continues to have occas pains in thighs  as above\par did not go to vascular - not that troublesome\par wears supp hose\par has varicose veins\par has afib - no palpitations\par no chest pain, no sob\par \par 2/28/20\par pt has been doing well\par discussed advanced directives\par pt decided on home DNR\par no chest pain\par no sob\par no palps\par \par 4/23/20\par pt is now dnr and wears a bracelet\par she is feeling well\par and is independent\par occas papitations\par sticking feeling in rt thigh unchanged\par reviewed meds\par \par 5/20/20\par pt is well\par rare palpitatons\par occas sticking feeling in thigh\par varicose veins\par reviewed meds and purpose of each\par \par 7/16/20\par pt relays one evening she had palpitaitons - did not report\par otherwise well\par less appetite in summer\par edema foot\par diminished hearing\par \par 9/3/20\par no more episodes of palpitations\par no edema\par going to dentist today - needs a filling\par needs flu vaccine today

## 2020-09-05 NOTE — ASSESSMENT
[FreeTextEntry1] : pt with htn, afib\par to get hearing checked\par doing well\par \par 5/31/19\par no changes made\par \par 7/26/19\par no changes made\par \par 9/27/19\par reviewed proxy\par flu vaccine given\par restat extra 25 mg toprol\par hr \par \par 11/151/9\par pt seems to have pain  in varicose veins - irregular patterns\par suggested vascular consult\par a fib\par mild sob on exertion\par wearing supp hose\par \par 1/10/20\par decided against vascular consult\par a fib controlled\par BP controlled\par \par 2/28/20\par signed home DNR\par renewed all meds\par \par 4/23/20\par pt is doing well\par no changes made\par \par 5/20/20\par resigned DNR\par \par 7/16/20\par pt is doing well\par cardiac stable\par cleaned ears\par doing well\par no changes in meds\par \par 9/3/20\par doing well\par flu vaccine given\par to go to dentist\par check records re if any blood tests are needed

## 2020-09-05 NOTE — PHYSICAL EXAM
[General Appearance - Well Nourished] : well nourished [General Appearance - In No Acute Distress] : in no acute distress [General Appearance - Alert] : alert [General Appearance - Well-Appearing] : healthy appearing [General Appearance - Well Developed] : well developed [PERRL With Normal Accommodation] : pupils were equal in size, round, and reactive to light [Extraocular Movements] : extraocular movements were intact [Sclera] : the sclera and conjunctiva were normal [No Oral Pallor] : no oral pallor [No Oral Cyanosis] : no oral cyanosis [Normal Oral Mucosa] : normal oral mucosa [Examination Of The Oral Cavity] : the lips and gums were normal [Hearing Threshold Finger Rub Not [place]] : hearing was normal [Outer Ear] : the ears and nose were normal in appearance [Respiration, Rhythm And Depth] : normal respiratory rhythm and effort [] : no respiratory distress [Exaggerated Use Of Accessory Muscles For Inspiration] : no accessory muscle use [Neck Appearance] : the appearance of the neck was normal [Auscultation Breath Sounds / Voice Sounds] : lungs were clear to auscultation bilaterally [Heart Sounds Gallop] : no gallops [Heart Sounds] : normal S1 and S2 [Murmurs] : no murmurs [Heart Sounds Pericardial Friction Rub] : no pericardial rub [Abdomen Soft] : soft [Edema] : there was no peripheral edema [Abdomen Tenderness] : non-tender [Cervical Lymph Nodes Enlarged Posterior Bilaterally] : posterior cervical [Abdomen Mass (___ Cm)] : no abdominal mass palpated [Cervical Lymph Nodes Enlarged Anterior Bilaterally] : anterior cervical [No CVA Tenderness] : no ~M costovertebral angle tenderness [No Spinal Tenderness] : no spinal tenderness [Nail Clubbing] : no clubbing  or cyanosis of the fingernails [Involuntary Movements] : no involuntary movements were seen [Abnormal Walk] : normal gait [Motor Tone] : muscle strength and tone were normal [FreeTextEntry1] : oa hands, [Musculoskeletal - Swelling] : no joint swelling seen [Skin Color & Pigmentation] : normal skin color and pigmentation [No Focal Deficits] : no focal deficits

## 2020-10-29 ENCOUNTER — APPOINTMENT (OUTPATIENT)
Dept: GERIATRICS | Facility: HOME HEALTH | Age: 85
End: 2020-10-29
Payer: MEDICARE

## 2020-10-29 VITALS
SYSTOLIC BLOOD PRESSURE: 145 MMHG | BODY MASS INDEX: 25.13 KG/M2 | HEART RATE: 80 BPM | DIASTOLIC BLOOD PRESSURE: 90 MMHG | OXYGEN SATURATION: 100 % | WEIGHT: 151 LBS | TEMPERATURE: 96.9 F

## 2020-10-29 PROCEDURE — 99348 HOME/RES VST EST LOW MDM 30: CPT

## 2020-10-29 NOTE — REVIEW OF SYSTEMS
[Eyesight Problems] : eyesight problems [Loss Of Hearing] : hearing loss [Arthralgias] : arthralgias [Negative] : Heme/Lymph

## 2020-10-29 NOTE — ASSESSMENT
[FreeTextEntry1] : pt with htn, afib\par to get hearing checked\par doing well\par \par 5/31/19\par no changes made\par \par 7/26/19\par no changes made\par \par 9/27/19\par reviewed proxy\par flu vaccine given\par restat extra 25 mg toprol\par hr \par \par 11/151/9\par pt seems to have pain  in varicose veins - irregular patterns\par suggested vascular consult\par a fib\par mild sob on exertion\par wearing supp hose\par \par 1/10/20\par decided against vascular consult\par a fib controlled\par BP controlled\par \par 2/28/20\par signed home DNR\par renewed all meds\par \par 4/23/20\par pt is doing well\par no changes made\par \par 5/20/20\par resigned DNR\par \par 7/16/20\par pt is doing well\par cardiac stable\par cleaned ears\par doing well\par no changes in meds\par \par 9/3/20\par doing well\par flu vaccine given\par to go to dentist\par check records re if any blood tests are needed\par \par 10/29/20\par signed DNR\par optho re retinal hole\par check need for labs\par watch BP - slightly high\par heart rate well controlled

## 2020-10-29 NOTE — PHYSICAL EXAM
[General Appearance - Alert] : alert [General Appearance - In No Acute Distress] : in no acute distress [General Appearance - Well Nourished] : well nourished [General Appearance - Well Developed] : well developed [General Appearance - Well-Appearing] : healthy appearing [Sclera] : the sclera and conjunctiva were normal [PERRL With Normal Accommodation] : pupils were equal in size, round, and reactive to light [Extraocular Movements] : extraocular movements were intact [Normal Oral Mucosa] : normal oral mucosa [No Oral Pallor] : no oral pallor [No Oral Cyanosis] : no oral cyanosis [Outer Ear] : the ears and nose were normal in appearance [Hearing Threshold Finger Rub Not [place]] : hearing was normal [Examination Of The Oral Cavity] : the lips and gums were normal [Neck Appearance] : the appearance of the neck was normal [] : no respiratory distress [Respiration, Rhythm And Depth] : normal respiratory rhythm and effort [Exaggerated Use Of Accessory Muscles For Inspiration] : no accessory muscle use [Auscultation Breath Sounds / Voice Sounds] : lungs were clear to auscultation bilaterally [Heart Sounds] : normal S1 and S2 [Heart Sounds Gallop] : no gallops [Murmurs] : no murmurs [Heart Sounds Pericardial Friction Rub] : no pericardial rub [Abdomen Soft] : soft [Abdomen Tenderness] : non-tender [Abdomen Mass (___ Cm)] : no abdominal mass palpated [Cervical Lymph Nodes Enlarged Posterior Bilaterally] : posterior cervical [Cervical Lymph Nodes Enlarged Anterior Bilaterally] : anterior cervical [No CVA Tenderness] : no ~M costovertebral angle tenderness [No Spinal Tenderness] : no spinal tenderness [Abnormal Walk] : normal gait [Nail Clubbing] : no clubbing  or cyanosis of the fingernails [Involuntary Movements] : no involuntary movements were seen [Musculoskeletal - Swelling] : no joint swelling seen [Motor Tone] : muscle strength and tone were normal [FreeTextEntry1] : oa hands, [Skin Color & Pigmentation] : normal skin color and pigmentation [No Focal Deficits] : no focal deficits [Oriented To Time, Place, And Person] : oriented to person, place, and time [Impaired Insight] : insight and judgment were intact [Affect] : the affect was normal

## 2020-10-29 NOTE — HISTORY OF PRESENT ILLNESS
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\par afib, htn\par varicose veins\par c/o decreased hearing\par \par 19\par f/up\par had labs 3/2019 wnl\par \par 19\par pt is doing well\par c/o occas jabbing pains in rt thigh\par has varicose veins\par needs all med renewed\par \par 19\par occas jabs rt thigh\par varicose veins\par not takin toprol xl 25 - ran out\par palpitations if sh walks too fast\par needs flu v\par supplied health care proxy - discussed advance directives\par \par 11/15/19\par pt c/o pinching pains in legs - shania when on his feet\par does not want pain meds\par has some shortness of breath on walking\par does wear supp hose\par \par 1/10/20\par pt continues to have occas pains in thighs  as above\par did not go to vascular - not that troublesome\par wears supp hose\par has varicose veins\par has afib - no palpitations\par no chest pain, no sob\par \par 20\par pt has been doing well\par discussed advanced directives\par pt decided on home DNR\par no chest pain\par no sob\par no palps\par \par 20\par pt is now dnr and wears a bracelet\par she is feeling well\par and is independent\par occas papitations\par sticking feeling in rt thigh unchanged\par reviewed meds\par \par 20\par pt is well\par rare palpitatons\par occas sticking feeling in thigh\par varicose veins\par reviewed meds and purpose of each\par \par 20\par pt relays one evening she had palpitaitons - did not report\par otherwise well\par less appetite in summer\par edema foot\par diminished hearing\par \par 9/3/20\par no more episodes of palpitations\par no edema\par going to dentist today - needs a filling\par needs flu vaccine today\par \par 10/29/30\par pt reports she went to eye dr and was diagnosed with a retinal hole.\par she is being treated with eye drops\par she manages her own meds\par denies chest pain, palps and sob\par has been at  today and was leading procession "running around"

## 2020-11-04 NOTE — REVIEW OF SYSTEMS
[Loss Of Hearing] : hearing loss [Arthralgias] : arthralgias [Negative] : Heme/Lymph Erythromycin Pregnancy And Lactation Text: This medication is Pregnancy Category B and is considered safe during pregnancy. It is also excreted in breast milk.

## 2020-12-29 ENCOUNTER — APPOINTMENT (OUTPATIENT)
Dept: GERIATRICS | Facility: HOME HEALTH | Age: 85
End: 2020-12-29
Payer: MEDICARE

## 2020-12-29 PROCEDURE — 99348 HOME/RES VST EST LOW MDM 30: CPT

## 2020-12-30 VITALS
TEMPERATURE: 96.9 F | BODY MASS INDEX: 25.46 KG/M2 | OXYGEN SATURATION: 100 % | WEIGHT: 153 LBS | HEART RATE: 79 BPM | DIASTOLIC BLOOD PRESSURE: 85 MMHG | SYSTOLIC BLOOD PRESSURE: 130 MMHG

## 2020-12-30 NOTE — ASSESSMENT
[FreeTextEntry1] : pt with htn, afib\par to get hearing checked\par doing well\par \par 5/31/19\par no changes made\par \par 7/26/19\par no changes made\par \par 9/27/19\par reviewed proxy\par flu vaccine given\par restat extra 25 mg toprol\par hr \par \par 11/151/9\par pt seems to have pain  in varicose veins - irregular patterns\par suggested vascular consult\par a fib\par mild sob on exertion\par wearing supp hose\par \par 1/10/20\par decided against vascular consult\par a fib controlled\par BP controlled\par \par 2/28/20\par signed home DNR\par renewed all meds\par \par 4/23/20\par pt is doing well\par no changes made\par \par 5/20/20\par resigned DNR\par \par 7/16/20\par pt is doing well\par cardiac stable\par cleaned ears\par doing well\par no changes in meds\par \par 9/3/20\par doing well\par flu vaccine given\par to go to dentist\par check records re if any blood tests are needed\par \par 10/29/20\par signed DNR\par optho re retinal hole\par check need for labs\par watch BP - slightly high\par heart rate well controlled\par \par 12/29/20\par pt is doing well\par [facility]\par cont meds

## 2020-12-30 NOTE — PHYSICAL EXAM
[General Appearance - Alert] : alert [General Appearance - In No Acute Distress] : in no acute distress [General Appearance - Well Nourished] : well nourished [General Appearance - Well Developed] : well developed [General Appearance - Well-Appearing] : healthy appearing [Sclera] : the sclera and conjunctiva were normal [PERRL With Normal Accommodation] : pupils were equal in size, round, and reactive to light [Extraocular Movements] : extraocular movements were intact [Normal Oral Mucosa] : normal oral mucosa [No Oral Pallor] : no oral pallor [No Oral Cyanosis] : no oral cyanosis [Outer Ear] : the ears and nose were normal in appearance [Hearing Threshold Finger Rub Not [place]] : hearing was normal [Examination Of The Oral Cavity] : the lips and gums were normal [Neck Appearance] : the appearance of the neck was normal [] : no respiratory distress [Respiration, Rhythm And Depth] : normal respiratory rhythm and effort [Exaggerated Use Of Accessory Muscles For Inspiration] : no accessory muscle use [Auscultation Breath Sounds / Voice Sounds] : lungs were clear to auscultation bilaterally [Heart Sounds] : normal S1 and S2 [Heart Sounds Gallop] : no gallops [Murmurs] : no murmurs [Heart Sounds Pericardial Friction Rub] : no pericardial rub [Abdomen Soft] : soft [Abdomen Tenderness] : non-tender [Abdomen Mass (___ Cm)] : no abdominal mass palpated [Cervical Lymph Nodes Enlarged Posterior Bilaterally] : posterior cervical [Cervical Lymph Nodes Enlarged Anterior Bilaterally] : anterior cervical [No CVA Tenderness] : no ~M costovertebral angle tenderness [No Spinal Tenderness] : no spinal tenderness [Abnormal Walk] : normal gait [Nail Clubbing] : no clubbing  or cyanosis of the fingernails [Involuntary Movements] : no involuntary movements were seen [Musculoskeletal - Swelling] : no joint swelling seen [Motor Tone] : muscle strength and tone were normal [FreeTextEntry1] : oa hands, [Skin Color & Pigmentation] : normal skin color and pigmentation [No Focal Deficits] : no focal deficits [Oriented To Time, Place, And Person] : oriented to person, place, and time [Impaired Insight] : insight and judgment were intact [Affect] : the affect was normal

## 2020-12-30 NOTE — REVIEW OF SYSTEMS
[Eyesight Problems] : eyesight problems [Chest Pain] : chest pain [Lower Ext Edema] : lower extremity edema [Negative] : Endocrine

## 2020-12-30 NOTE — HISTORY OF PRESENT ILLNESS
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\par afib, htn\par varicose veins\par c/o decreased hearing\par \par 19\par f/up\par had labs 3/2019 wnl\par \par 19\par pt is doing well\par c/o occas jabbing pains in rt thigh\par has varicose veins\par needs all med renewed\par \par 19\par occas jabs rt thigh\par varicose veins\par not takin toprol xl 25 - ran out\par palpitations if sh walks too fast\par needs flu v\par supplied health care proxy - discussed advance directives\par \par 11/15/19\par pt c/o pinching pains in legs - shania when on his feet\par does not want pain meds\par has some shortness of breath on walking\par does wear supp hose\par \par 1/10/20\par pt continues to have occas pains in thighs  as above\par did not go to vascular - not that troublesome\par wears supp hose\par has varicose veins\par has afib - no palpitations\par no chest pain, no sob\par \par 20\par pt has been doing well\par discussed advanced directives\par pt decided on home DNR\par no chest pain\par no sob\par no palps\par \par 20\par pt is now dnr and wears a bracelet\par she is feeling well\par and is independent\par occas papitations\par sticking feeling in rt thigh unchanged\par reviewed meds\par \par 20\par pt is well\par rare palpitatons\par occas sticking feeling in thigh\par varicose veins\par reviewed meds and purpose of each\par \par 20\par pt relays one evening she had palpitaitons - did not report\par otherwise well\par less appetite in summer\par edema foot\par diminished hearing\par \par 9/3/20\par no more episodes of palpitations\par no edema\par going to dentist today - needs a filling\par needs flu vaccine today\par \par 10/29/30\par pt reports she went to eye dr and was diagnosed with a retinal hole.\par she is being treated with eye drops\par she manages her own meds\par denies chest pain, palps and sob\par has been at  today and was leading procession "running around"\par \par 20\par she and sister are praying re retinal hole\par has vision loss\par LE edema at the end of the day\par occas chest pain on exertion

## 2021-02-25 ENCOUNTER — APPOINTMENT (OUTPATIENT)
Dept: GERIATRICS | Facility: HOME HEALTH | Age: 86
End: 2021-02-25
Payer: MEDICARE

## 2021-02-25 PROCEDURE — 99348 HOME/RES VST EST LOW MDM 30: CPT

## 2021-02-26 VITALS
WEIGHT: 154.4 LBS | TEMPERATURE: 96.4 F | BODY MASS INDEX: 25.69 KG/M2 | HEART RATE: 85 BPM | SYSTOLIC BLOOD PRESSURE: 140 MMHG | DIASTOLIC BLOOD PRESSURE: 80 MMHG | OXYGEN SATURATION: 100 %

## 2021-02-26 NOTE — ASSESSMENT
[FreeTextEntry1] : pt with htn, afib\par to get hearing checked\par doing well\par \par 5/31/19\par no changes made\par \par 7/26/19\par no changes made\par \par 9/27/19\par reviewed proxy\par flu vaccine given\par restat extra 25 mg toprol\par hr \par \par 11/151/9\par pt seems to have pain  in varicose veins - irregular patterns\par suggested vascular consult\par a fib\par mild sob on exertion\par wearing supp hose\par \par 1/10/20\par decided against vascular consult\par a fib controlled\par BP controlled\par \par 2/28/20\par signed home DNR\par renewed all meds\par \par 4/23/20\par pt is doing well\par no changes made\par \par 5/20/20\par resigned DNR\par \par 7/16/20\par pt is doing well\par cardiac stable\par cleaned ears\par doing well\par no changes in meds\par \par 9/3/20\par doing well\par flu vaccine given\par to go to dentist\par check records re if any blood tests are needed\par \par 10/29/20\par signed DNR\par optho re retinal hole\par check need for labs\par watch BP - slightly high\par heart rate well controlled\par \par 12/29/20\par pt is doing well\par [facility] cont meds\par \par 2.25.21\par renewed all meds\par labs good\par angina stable\par to see optho\par renewed DNR

## 2021-02-26 NOTE — HISTORY OF PRESENT ILLNESS
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\par afib, htn\par varicose veins\par c/o decreased hearing\par \par 19\par f/up\par had labs 3/2019 wnl\par \par 19\par pt is doing well\par c/o occas jabbing pains in rt thigh\par has varicose veins\par needs all med renewed\par \par 19\par occas jabs rt thigh\par varicose veins\par not takin toprol xl 25 - ran out\par palpitations if sh walks too fast\par needs flu v\par supplied health care proxy - discussed advance directives\par \par 11/15/19\par pt c/o pinching pains in legs - shania when on his feet\par does not want pain meds\par has some shortness of breath on walking\par does wear supp hose\par \par 1/10/20\par pt continues to have occas pains in thighs  as above\par did not go to vascular - not that troublesome\par wears supp hose\par has varicose veins\par has afib - no palpitations\par no chest pain, no sob\par \par 20\par pt has been doing well\par discussed advanced directives\par pt decided on home DNR\par no chest pain\par no sob\par no palps\par \par 20\par pt is now dnr and wears a bracelet\par she is feeling well\par and is independent\par occas papitations\par sticking feeling in rt thigh unchanged\par reviewed meds\par \par 20\par pt is well\par rare palpitatons\par occas sticking feeling in thigh\par varicose veins\par reviewed meds and purpose of each\par \par 20\par pt relays one evening she had palpitaitons - did not report\par otherwise well\par less appetite in summer\par edema foot\par diminished hearing\par \par 9/3/20\par no more episodes of palpitations\par no edema\par going to dentist today - needs a filling\par needs flu vaccine today\par \par 10/29/30\par pt reports she went to eye dr and was diagnosed with a retinal hole.\par she is being treated with eye drops\par she manages her own meds\par denies chest pain, palps and sob\par has been at  today and was leading procession "running around"\par \par 20\par she and sister are praying re retinal hole\par has vision loss\par LE edema at the end of the day\par occas chest pain on exertion\par \par 21\par sometimes palpitations at night or when walks quickly\par she did walk with me walker up ramp and was quick and smooth\par had walker adjusted to be higher to help with posture\par had labs 21 - wnl

## 2021-02-26 NOTE — REVIEW OF SYSTEMS
[Eyesight Problems] : eyesight problems [Heart Rate Is Fast] : fast heart rate [Chest Pain] : chest pain [Arthralgias] : arthralgias [Negative] : Heme/Lymph [FreeTextEntry3] : retinal hole -eye dr - 3/16/21 [FreeTextEntry5] : on exertion - stable

## 2021-02-26 NOTE — PHYSICAL EXAM
[General Appearance - Alert] : alert [General Appearance - In No Acute Distress] : in no acute distress [General Appearance - Well Nourished] : well nourished [General Appearance - Well Developed] : well developed [General Appearance - Well-Appearing] : healthy appearing [Sclera] : the sclera and conjunctiva were normal [PERRL With Normal Accommodation] : pupils were equal in size, round, and reactive to light [Extraocular Movements] : extraocular movements were intact [Normal Oral Mucosa] : normal oral mucosa [No Oral Pallor] : no oral pallor [No Oral Cyanosis] : no oral cyanosis [Outer Ear] : the ears and nose were normal in appearance [Hearing Threshold Finger Rub Not [place]] : hearing was normal [Examination Of The Oral Cavity] : the lips and gums were normal [Neck Appearance] : the appearance of the neck was normal [] : no respiratory distress [Respiration, Rhythm And Depth] : normal respiratory rhythm and effort [Exaggerated Use Of Accessory Muscles For Inspiration] : no accessory muscle use [Auscultation Breath Sounds / Voice Sounds] : lungs were clear to auscultation bilaterally [Heart Sounds] : normal S1 and S2 [Heart Sounds Gallop] : no gallops [Murmurs] : no murmurs [Heart Sounds Pericardial Friction Rub] : no pericardial rub [Abdomen Soft] : soft [Abdomen Tenderness] : non-tender [Abdomen Mass (___ Cm)] : no abdominal mass palpated [Cervical Lymph Nodes Enlarged Posterior Bilaterally] : posterior cervical [Cervical Lymph Nodes Enlarged Anterior Bilaterally] : anterior cervical [No CVA Tenderness] : no ~M costovertebral angle tenderness [No Spinal Tenderness] : no spinal tenderness [Abnormal Walk] : normal gait [Nail Clubbing] : no clubbing  or cyanosis of the fingernails [Involuntary Movements] : no involuntary movements were seen [Musculoskeletal - Swelling] : no joint swelling seen [Motor Tone] : muscle strength and tone were normal [FreeTextEntry1] : oa hands, [Skin Color & Pigmentation] : normal skin color and pigmentation [No Focal Deficits] : no focal deficits [Oriented To Time, Place, And Person] : oriented to person, place, and time [Impaired Insight] : insight and judgment were intact [Affect] : the affect was normal

## 2021-04-14 ENCOUNTER — APPOINTMENT (OUTPATIENT)
Dept: GERIATRICS | Facility: HOME HEALTH | Age: 86
End: 2021-04-14
Payer: MEDICARE

## 2021-04-14 PROCEDURE — 99348 HOME/RES VST EST LOW MDM 30: CPT

## 2021-04-15 VITALS
SYSTOLIC BLOOD PRESSURE: 145 MMHG | BODY MASS INDEX: 24.99 KG/M2 | WEIGHT: 150.2 LBS | DIASTOLIC BLOOD PRESSURE: 90 MMHG | HEART RATE: 96 BPM | TEMPERATURE: 93.5 F | OXYGEN SATURATION: 100 %

## 2021-04-15 NOTE — REVIEW OF SYSTEMS
[Eyesight Problems] : eyesight problems [Loss Of Hearing] : hearing loss [Palpitations] : palpitations [SOB on Exertion] : shortness of breath during exertion [Negative] : Heme/Lymph

## 2021-04-15 NOTE — ASSESSMENT
[FreeTextEntry1] : pt with htn, afib\par to get hearing checked\par doing well\par \par 5/31/19\par no changes made\par \par 7/26/19\par no changes made\par \par 9/27/19\par reviewed proxy\par flu vaccine given\par restat extra 25 mg toprol\par hr \par \par 11/151/9\par pt seems to have pain  in varicose veins - irregular patterns\par suggested vascular consult\par a fib\par mild sob on exertion\par wearing supp hose\par \par 1/10/20\par decided against vascular consult\par a fib controlled\par BP controlled\par \par 2/28/20\par signed home DNR\par renewed all meds\par \par 4/23/20\par pt is doing well\par no changes made\par \par 5/20/20\par resigned DNR\par \par 7/16/20\par pt is doing well\par cardiac stable\par cleaned ears\par doing well\par no changes in meds\par \par 9/3/20\par doing well\par flu vaccine given\par to go to dentist\par check records re if any blood tests are needed\par \par 10/29/20\par signed DNR\par optho re retinal hole\par check need for labs\par watch BP - slightly high\par heart rate well controlled\par \par 12/29/20\par pt is doing well\par [facility] cont [facility]s\par \par 2.25.21\par renewed all meds\par labs good\par angina stable\par to see optho\par renewed DNR\par \par 4/14/21\par pt did not take toprol this am - got busy\par heart rate and BP slightly elevated\par mild stable angina\par did DNR\par to go to optho - re retinal tear\par

## 2021-04-15 NOTE — HISTORY OF PRESENT ILLNESS
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\par afib, htn\par varicose veins\par c/o decreased hearing\par \par 19\par f/up\par had labs 3/2019 wnl\par \par 19\par pt is doing well\par c/o occas jabbing pains in rt thigh\par has varicose veins\par needs all med renewed\par \par 19\par occas jabs rt thigh\par varicose veins\par not takin toprol xl 25 - ran out\par palpitations if sh walks too fast\par needs flu v\par supplied health care proxy - discussed advance directives\par \par 11/15/19\par pt c/o pinching pains in legs - shania when on his feet\par does not want pain meds\par has some shortness of breath on walking\par does wear supp hose\par \par 1/10/20\par pt continues to have occas pains in thighs  as above\par did not go to vascular - not that troublesome\par wears supp hose\par has varicose veins\par has afib - no palpitations\par no chest pain, no sob\par \par 20\par pt has been doing well\par discussed advanced directives\par pt decided on home DNR\par no chest pain\par no sob\par no palps\par \par 20\par pt is now dnr and wears a bracelet\par she is feeling well\par and is independent\par occas papitations\par sticking feeling in rt thigh unchanged\par reviewed meds\par \par 20\par pt is well\par rare palpitatons\par occas sticking feeling in thigh\par varicose veins\par reviewed meds and purpose of each\par \par 20\par pt relays one evening she had palpitaitons - did not report\par otherwise well\par less appetite in summer\par edema foot\par diminished hearing\par \par 9/3/20\par no more episodes of palpitations\par no edema\par going to dentist today - needs a filling\par needs flu vaccine today\par \par 10/29/30\par pt reports she went to eye dr and was diagnosed with a retinal hole.\par she is being treated with eye drops\par she manages her own meds\par denies chest pain, palps and sob\par has been at  today and was leading procession "running around"\par \par 20\par she and sister are praying re retinal hole\par has vision loss\par LE edema at the end of the day\par occas chest pain on exertion\par \par 21\par sometimes palpitations at night or when walks quickly\par she did walk with me walker up ramp and was quick and smooth\par had walker adjusted to be higher to help with posture\par had labs 21 - wnl\par \par 21\par \par pt notes palpitations if walks fast especially on incline - uses rollator\par some brief chest pains at night\par this is stable - no increase in symptoms\par going to optho later - has hole in retina\par has distortion and spots in left eye

## 2021-04-15 NOTE — PHYSICAL EXAM
[General Appearance - Alert] : alert [General Appearance - In No Acute Distress] : in no acute distress [General Appearance - Well Nourished] : well nourished [General Appearance - Well Developed] : well developed [General Appearance - Well-Appearing] : healthy appearing [Sclera] : the sclera and conjunctiva were normal [PERRL With Normal Accommodation] : pupils were equal in size, round, and reactive to light [Extraocular Movements] : extraocular movements were intact [Normal Oral Mucosa] : normal oral mucosa [No Oral Pallor] : no oral pallor [No Oral Cyanosis] : no oral cyanosis [Outer Ear] : the ears and nose were normal in appearance [Hearing Threshold Finger Rub Not [place]] : hearing was normal [Examination Of The Oral Cavity] : the lips and gums were normal [Neck Appearance] : the appearance of the neck was normal [] : no respiratory distress [Respiration, Rhythm And Depth] : normal respiratory rhythm and effort [Exaggerated Use Of Accessory Muscles For Inspiration] : no accessory muscle use [Auscultation Breath Sounds / Voice Sounds] : lungs were clear to auscultation bilaterally [Heart Sounds] : normal S1 and S2 [Heart Sounds Gallop] : no gallops [Murmurs] : no murmurs [Heart Sounds Pericardial Friction Rub] : no pericardial rub [Abdomen Soft] : soft [Abdomen Tenderness] : non-tender [Abdomen Mass (___ Cm)] : no abdominal mass palpated [Cervical Lymph Nodes Enlarged Posterior Bilaterally] : posterior cervical [Cervical Lymph Nodes Enlarged Anterior Bilaterally] : anterior cervical [No CVA Tenderness] : no ~M costovertebral angle tenderness [No Spinal Tenderness] : no spinal tenderness [Abnormal Walk] : normal gait [Nail Clubbing] : no clubbing  or cyanosis of the fingernails [Involuntary Movements] : no involuntary movements were seen [Musculoskeletal - Swelling] : no joint swelling seen [Motor Tone] : muscle strength and tone were normal [FreeTextEntry1] : oa hands, [Skin Color & Pigmentation] : normal skin color and pigmentation [No Focal Deficits] : no focal deficits [Oriented To Time, Place, And Person] : oriented to person, place, and time [Impaired Insight] : insight and judgment were intact [Affect] : the affect was normal

## 2021-06-03 ENCOUNTER — APPOINTMENT (OUTPATIENT)
Dept: GERIATRICS | Facility: HOME HEALTH | Age: 86
End: 2021-06-03
Payer: MEDICARE

## 2021-06-03 PROCEDURE — 99349 HOME/RES VST EST MOD MDM 40: CPT

## 2021-06-05 VITALS
HEART RATE: 88 BPM | TEMPERATURE: 95.5 F | WEIGHT: 153.6 LBS | DIASTOLIC BLOOD PRESSURE: 80 MMHG | SYSTOLIC BLOOD PRESSURE: 140 MMHG | OXYGEN SATURATION: 100 % | BODY MASS INDEX: 25.56 KG/M2

## 2021-06-05 NOTE — REVIEW OF SYSTEMS
[Eyesight Problems] : eyesight problems [Chest Pain] : chest pain [Lower Ext Edema] : lower extremity edema [Heartburn] : heartburn [Arthralgias] : arthralgias [Difficulty Walking] : difficulty walking [Negative] : Heme/Lymph [FreeTextEntry5] : mild on exertion - stable pattern

## 2021-06-05 NOTE — HISTORY OF PRESENT ILLNESS
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\par afib, htn\par varicose veins\par c/o decreased hearing\par \par 19\par f/up\par had labs 3/2019 wnl\par \par 19\par pt is doing well\par c/o occas jabbing pains in rt thigh\par has varicose veins\par needs all med renewed\par \par 19\par occas jabs rt thigh\par varicose veins\par not takin toprol xl 25 - ran out\par palpitations if sh walks too fast\par needs flu v\par supplied health care proxy - discussed advance directives\par \par 11/15/19\par pt c/o pinching pains in legs - shania when on his feet\par does not want pain meds\par has some shortness of breath on walking\par does wear supp hose\par \par 1/10/20\par pt continues to have occas pains in thighs  as above\par did not go to vascular - not that troublesome\par wears supp hose\par has varicose veins\par has afib - no palpitations\par no chest pain, no sob\par \par 20\par pt has been doing well\par discussed advanced directives\par pt decided on home DNR\par no chest pain\par no sob\par no palps\par \par 20\par pt is now dnr and wears a bracelet\par she is feeling well\par and is independent\par occas papitations\par sticking feeling in rt thigh unchanged\par reviewed meds\par \par 20\par pt is well\par rare palpitatons\par occas sticking feeling in thigh\par varicose veins\par reviewed meds and purpose of each\par \par 20\par pt relays one evening she had palpitaitons - did not report\par otherwise well\par less appetite in summer\par edema foot\par diminished hearing\par \par 9/3/20\par no more episodes of palpitations\par no edema\par going to dentist today - needs a filling\par needs flu vaccine today\par \par 10/29/30\par pt reports she went to eye dr and was diagnosed with a retinal hole.\par she is being treated with eye drops\par she manages her own meds\par denies chest pain, palps and sob\par has been at  today and was leading procession "running around"\par \par 20\par she and sister are praying re retinal hole\par has vision loss\par LE edema at the end of the day\par occas chest pain on exertion\par \par 21\par sometimes palpitations at night or when walks quickly\par she did walk with me walker up ramp and was quick and smooth\par had walker adjusted to be higher to help with posture\par had labs 21 - wnl\par \par 21\par \par pt notes palpitations if walks fast especially on incline - uses rollator\par some brief chest pains at night\par this is stable - no increase in symptoms\par going to optho later - has hole in retina\par has distortion and spots in left eye\par \par 6/3/21\par pt needs eye surgery to repair macular hole\par needs clearance\par pt is feeling well - able to walk a flight of stairs\par ocas chest pain/gas pains - stable -\par has a fib denies - palpitation or shortness of breath\par she is alert\par forms to fill out

## 2021-06-05 NOTE — REASON FOR VISIT
[Follow-Up] : a follow-up visit [FreeTextEntry1] : pre op clearance to repair macular hole on 7/6/21

## 2021-06-05 NOTE — ASSESSMENT
[FreeTextEntry1] : pt with htn, afib\par to get hearing checked\par doing well\par \par 5/31/19\par no changes made\par \par 7/26/19\par no changes made\par \par 9/27/19\par reviewed proxy\par flu vaccine given\par restat extra 25 mg toprol\par hr \par \par 11/151/9\par pt seems to have pain  in varicose veins - irregular patterns\par suggested vascular consult\par a fib\par mild sob on exertion\par wearing supp hose\par \par 1/10/20\par decided against vascular consult\par a fib controlled\par BP controlled\par \par 2/28/20\par signed home DNR\par renewed all meds\par \par 4/23/20\par pt is doing well\par no changes made\par \par 5/20/20\par resigned DNR\par \par 7/16/20\par pt is doing well\par cardiac stable\par cleaned ears\par doing well\par no changes in meds\par \par 9/3/20\par doing well\par flu vaccine given\par to go to dentist\par check records re if any blood tests are needed\par \par 10/29/20\par signed DNR\par optho re retinal hole\par check need for labs\par watch BP - slightly high\par heart rate well controlled\par \par 12/29/20\par pt is doing well\par [facility]\par cont meds\par \par 2.25.21\par renewed all meds\par labs good\par angina stable\par to see optho\par renewed DNR\par \par 4/14/21\par pt did not take toprol this am - got busy\par heart rate and BP slightly elevated\par mild stable angina\par did DNR\par to go to optho - re retinal tear\par \par 6/3/21\par pre-op clearance\par labs ordered\par EKG a fib with q wave v1-3\par compared with old ekg - 5/16/17 - q waves are new\par echo ordered\par suggested fup cardiologist\par \par vital signs are stable\par angina stable\par \par no contraindication to planned repair of macular hole\par on 7/6/21\par low risk procdeure\par \par ok to hold eliquis as per optho\par \par

## 2021-06-05 NOTE — PHYSICAL EXAM
[General Appearance - In No Acute Distress] : in no acute distress [General Appearance - Alert] : alert [General Appearance - Well Nourished] : well nourished [General Appearance - Well Developed] : well developed [General Appearance - Well-Appearing] : healthy appearing [PERRL With Normal Accommodation] : pupils were equal in size, round, and reactive to light [Sclera] : the sclera and conjunctiva were normal [Extraocular Movements] : extraocular movements were intact [Normal Oral Mucosa] : normal oral mucosa [No Oral Pallor] : no oral pallor [No Oral Cyanosis] : no oral cyanosis [Outer Ear] : the ears and nose were normal in appearance [Hearing Threshold Finger Rub Not [place]] : hearing was normal [Examination Of The Oral Cavity] : the lips and gums were normal [Neck Appearance] : the appearance of the neck was normal [] : no respiratory distress [Exaggerated Use Of Accessory Muscles For Inspiration] : no accessory muscle use [Respiration, Rhythm And Depth] : normal respiratory rhythm and effort [Auscultation Breath Sounds / Voice Sounds] : lungs were clear to auscultation bilaterally [Heart Sounds] : normal S1 and S2 [Heart Sounds Gallop] : no gallops [Murmurs] : no murmurs [Heart Sounds Pericardial Friction Rub] : no pericardial rub [Abdomen Soft] : soft [Abdomen Tenderness] : non-tender [Abdomen Mass (___ Cm)] : no abdominal mass palpated [Cervical Lymph Nodes Enlarged Posterior Bilaterally] : posterior cervical [Cervical Lymph Nodes Enlarged Anterior Bilaterally] : anterior cervical [No Spinal Tenderness] : no spinal tenderness [No CVA Tenderness] : no ~M costovertebral angle tenderness [Abnormal Walk] : normal gait [Nail Clubbing] : no clubbing  or cyanosis of the fingernails [Involuntary Movements] : no involuntary movements were seen [Musculoskeletal - Swelling] : no joint swelling seen [Motor Tone] : muscle strength and tone were normal [Skin Color & Pigmentation] : normal skin color and pigmentation [No Focal Deficits] : no focal deficits [Oriented To Time, Place, And Person] : oriented to person, place, and time [Impaired Insight] : insight and judgment were intact [Affect] : the affect was normal [FreeTextEntry1] : oa hands,

## 2021-07-01 ENCOUNTER — NON-APPOINTMENT (OUTPATIENT)
Age: 86
End: 2021-07-01

## 2021-07-01 ENCOUNTER — APPOINTMENT (OUTPATIENT)
Dept: CARDIOLOGY | Facility: CLINIC | Age: 86
End: 2021-07-01
Payer: MEDICARE

## 2021-07-01 VITALS
TEMPERATURE: 98.6 F | SYSTOLIC BLOOD PRESSURE: 150 MMHG | WEIGHT: 144 LBS | BODY MASS INDEX: 23.99 KG/M2 | DIASTOLIC BLOOD PRESSURE: 90 MMHG | HEART RATE: 96 BPM | HEIGHT: 65 IN

## 2021-07-01 DIAGNOSIS — R91.8 OTHER NONSPECIFIC ABNORMAL FINDING OF LUNG FIELD: ICD-10-CM

## 2021-07-01 DIAGNOSIS — I73.00 RAYNAUD'S SYNDROME W/OUT GANGRENE: ICD-10-CM

## 2021-07-01 DIAGNOSIS — Z82.49 FAMILY HISTORY OF ISCHEMIC HEART DISEASE AND OTHER DISEASES OF THE CIRCULATORY SYSTEM: ICD-10-CM

## 2021-07-01 DIAGNOSIS — G45.4 TRANSIENT GLOBAL AMNESIA: ICD-10-CM

## 2021-07-01 PROCEDURE — G2212 PROLONG OUTPT/OFFICE VIS: CPT

## 2021-07-01 PROCEDURE — 99205 OFFICE O/P NEW HI 60 MIN: CPT

## 2021-07-01 PROCEDURE — 93000 ELECTROCARDIOGRAM COMPLETE: CPT

## 2021-07-01 NOTE — CARDIOLOGY SUMMARY
[de-identified] : 7/1/2021 - normal sinus @96, low voltage prwp  [de-identified] : nuclear 2016- normal, ef 72% [de-identified] : 2016-ef70%, dilated LA, mild MR,aortic sclerosis 8/5mmhg, pasp 36-41mmhg [de-identified] : chest ct 2018- coronary calcifications aorta 4.3 cm

## 2021-07-01 NOTE — HISTORY OF PRESENT ILLNESS
[FreeTextEntry1] : This 89 year old sister is referred by Dr Ramos, she is anticipating eye surgery on 7/6/2021.\par She was seen here last in 2017, followed for atrial fibrillation.\par \par Records from [facility], [facility] and [facility] reviewed.\par \par She denies dyspnea, or syncope.\par She has had palpitations at night 'my heart is pounding away" every night.\par She has had chest pressure with exertion.\par \par

## 2021-07-15 ENCOUNTER — APPOINTMENT (OUTPATIENT)
Dept: CARDIOLOGY | Facility: CLINIC | Age: 86
End: 2021-07-15
Payer: MEDICARE

## 2021-07-15 VITALS
HEIGHT: 65 IN | HEART RATE: 84 BPM | OXYGEN SATURATION: 100 % | TEMPERATURE: 97.9 F | DIASTOLIC BLOOD PRESSURE: 70 MMHG | SYSTOLIC BLOOD PRESSURE: 128 MMHG

## 2021-07-15 PROCEDURE — 99214 OFFICE O/P EST MOD 30 MIN: CPT

## 2021-07-15 RX ORDER — METOPROLOL SUCCINATE 50 MG/1
50 TABLET, EXTENDED RELEASE ORAL DAILY
Qty: 90 | Refills: 3 | Status: DISCONTINUED | COMMUNITY
Start: 1900-01-01 | End: 2021-07-01

## 2021-07-15 NOTE — PHYSICAL EXAM
[Irregularly Irregular] : irregularly irregular [No Acute Distress] : no acute distress [Clear Lung Fields] : clear lung fields [Good Air Entry] : good air entry [No Respiratory Distress] : no respiratory distress  [Edema ___] : edema [unfilled] [Moves all extremities] : moves all extremities [No Focal Deficits] : no focal deficits [Normal Speech] : normal speech [Alert and Oriented] : alert and oriented [de-identified] : uses walker, gait steady [de-identified] : left forearm with plaster cast, resting on sling

## 2021-07-15 NOTE — HISTORY OF PRESENT ILLNESS
[FreeTextEntry1] : 89 year old female with hypertension, hyperlipidemia, atrial fibrillation on Eliquis, pulmonary hypertension, pulmonary nodules, Raynaud's disease.

## 2021-07-15 NOTE — CARDIOLOGY SUMMARY
[de-identified] : 7/1/2021 - normal sinus rhythm HR 96, low voltage prwp  [de-identified] : nuclear 2016- normal, ef 72% [de-identified] : 2016-ef70%, dilated LA, mild MR,aortic sclerosis 8/5mmhg, pasp 36-41mmhg [de-identified] : chest ct 2018- coronary calcifications aorta 4.3 cm

## 2021-07-15 NOTE — REASON FOR VISIT
[Hypertension] : hypertension [FreeTextEntry1] : At last visit, metoprolol succinate increased to 100mg daily. Ms. Pinto presents for follow up visit. \par \par She underwent left eye macunal hole repair on 7/6/21. On Friday 7/9, she had a mechanical fall when she was returning to her room from the bathroom in the dark, she tripped and fell on her left arm. She has a left wrist fracture that has been treated with a plaster cast. She denies dizziness or loss of consciousness at time of fall. \par \par She denies chest pain, SOB, palpitations.

## 2021-07-15 NOTE — REVIEW OF SYSTEMS
[Lower Ext Edema] : lower extremity edema [Joint Pain] : joint pain [Memory Lapses Or Loss] : memory lapses or loss [Fever] : no fever [Headache] : no headache [Chills] : no chills [Feeling Fatigued] : not feeling fatigued [SOB] : no shortness of breath [Chest Discomfort] : no chest discomfort [Palpitations] : no palpitations [Syncope] : no syncope [Cough] : no cough [Dizziness] : no dizziness [Confusion] : no confusion was observed [Easy Bleeding] : no tendency for easy bleeding [Easy Bruising] : no tendency for easy bruising

## 2021-07-22 ENCOUNTER — APPOINTMENT (OUTPATIENT)
Dept: GERIATRICS | Facility: HOME HEALTH | Age: 86
End: 2021-07-22
Payer: MEDICARE

## 2021-07-22 DIAGNOSIS — M81.0 AGE-RELATED OSTEOPOROSIS W/OUT CURRENT PATHOLOGICAL FRACTURE: ICD-10-CM

## 2021-07-22 PROCEDURE — 99349 HOME/RES VST EST MOD MDM 40: CPT

## 2021-07-25 VITALS
TEMPERATURE: 97 F | BODY MASS INDEX: 24.4 KG/M2 | DIASTOLIC BLOOD PRESSURE: 82 MMHG | OXYGEN SATURATION: 97 % | HEART RATE: 72 BPM | SYSTOLIC BLOOD PRESSURE: 130 MMHG | WEIGHT: 146.6 LBS

## 2021-07-25 PROBLEM — M81.0 OSTEOPOROSIS: Status: ACTIVE | Noted: 2019-02-01

## 2021-07-25 NOTE — HISTORY OF PRESENT ILLNESS
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\par afib, htn\par varicose veins\par c/o decreased hearing\par \par 19\par f/up\par had labs 3/2019 wnl\par \par 19\par pt is doing well\par c/o occas jabbing pains in rt thigh\par has varicose veins\par needs all med renewed\par \par 19\par occas jabs rt thigh\par varicose veins\par not takin toprol xl 25 - ran out\par palpitations if sh walks too fast\par needs flu v\par supplied health care proxy - discussed advance directives\par \par 11/15/19\par pt c/o pinching pains in legs - shania when on his feet\par does not want pain meds\par has some shortness of breath on walking\par does wear supp hose\par \par 1/10/20\par pt continues to have occas pains in thighs  as above\par did not go to vascular - not that troublesome\par wears supp hose\par has varicose veins\par has afib - no palpitations\par no chest pain, no sob\par \par 20\par pt has been doing well\par discussed advanced directives\par pt decided on home DNR\par no chest pain\par no sob\par no palps\par \par 20\par pt is now dnr and wears a bracelet\par she is feeling well\par and is independent\par occas papitations\par sticking feeling in rt thigh unchanged\par reviewed meds\par \par 20\par pt is well\par rare palpitatons\par occas sticking feeling in thigh\par varicose veins\par reviewed meds and purpose of each\par \par 20\par pt relays one evening she had palpitaitons - did not report\par otherwise well\par less appetite in summer\par edema foot\par diminished hearing\par \par 9/3/20\par no more episodes of palpitations\par no edema\par going to dentist today - needs a filling\par needs flu vaccine today\par \par 10/29/30\par pt reports she went to eye dr and was diagnosed with a retinal hole.\par she is being treated with eye drops\par she manages her own meds\par denies chest pain, palps and sob\par has been at  today and was leading procession "running around"\par \par 20\par she and sister are praying re retinal hole\par has vision loss\par LE edema at the end of the day\par occas chest pain on exertion\par \par 21\par sometimes palpitations at night or when walks quickly\par she did walk with me walker up ramp and was quick and smooth\par had walker adjusted to be higher to help with posture\par had labs 21 - wnl\par \par 21\par \par pt notes palpitations if walks fast especially on incline - uses rollator\par some brief chest pains at night\par this is stable - no increase in symptoms\par going to optho later - has hole in retina\par has distortion and spots in left eye\par \par 6/3/21\par pt needs eye surgery to repair macular hole\par needs clearance\par pt is feeling well - able to walk a flight of stairs\par ocas chest pain/gas pains - stable -\par has a fib denies - palpitation or shortness of breath\par she is alert\par forms to fill out\par \par 21\par pt saw Dr Cordova cardiologist increased toprol to 100 mg a day\par ordered echo\par on  had repair of macular hole\par  fell at 2 am going to bathroom - 8 am went to  - cast and then reset last week\par radius fracutre\par vision is slowly improving\par staying on assisted living side\par not exerting self - trying to stay independent\par no chest pain

## 2021-07-25 NOTE — REVIEW OF SYSTEMS
[Eyesight Problems] : eyesight problems [Arthralgias] : arthralgias [Negative] : Heme/Lymph [FreeTextEntry9] : rt arm in cast

## 2021-07-25 NOTE — ASSESSMENT
[FreeTextEntry1] : pt with htn, afib\par to get hearing checked\par doing well\par \par 5/31/19\par no changes made\par \par 7/26/19\par no changes made\par \par 9/27/19\par reviewed proxy\par flu vaccine given\par restat extra 25 mg toprol\par hr \par \par 11/151/9\par pt seems to have pain  in varicose veins - irregular patterns\par suggested vascular consult\par a fib\par mild sob on exertion\par wearing supp hose\par \par 1/10/20\par decided against vascular consult\par a fib controlled\par BP controlled\par \par 2/28/20\par signed home DNR\par renewed all meds\par \par 4/23/20\par pt is doing well\par no changes made\par \par 5/20/20\par resigned DNR\par \par 7/16/20\par pt is doing well\par cardiac stable\par cleaned ears\par doing well\par no changes in meds\par \par 9/3/20\par doing well\par flu vaccine given\par to go to dentist\par check records re if any blood tests are needed\par \par 10/29/20\par signed DNR\par optho re retinal hole\par check need for labs\par watch BP - slightly high\par heart rate well controlled\par \par 12/29/20\par pt is doing well\par [facility] cont meds\par \par 2.25.21\par renewed all meds\par labs good\par angina stable\par to see optho\par renewed DNR\par \par 4/14/21\par pt did not take toprol this am - got busy\par heart rate and BP slightly elevated\par mild stable angina\par did DNR\par to go to optho - re retinal tear\par \par 6/3/21\par pre-op clearance\par labs ordered\par EKG a fib with q wave v1-3\par compared with old ekg - 5/16/17 - q waves are new\par echo ordered\par suggested fup cardiologist\par \par vital signs are stable\par angina stable\par \par no contraindication to planned repair of macular hole\par on 7/6/21\par low risk procdeure\par \par ok to hold eliquis as per optho\par \par 7/22/21\par macular hole repaired \par now with broken radius - in cast\par BP good\par recovering well\par echo mild AS, dilated LA\par mild pulmonary htn\par on increased toprol to 100 - with good results

## 2021-07-25 NOTE — PHYSICAL EXAM
[General Appearance - Alert] : alert [General Appearance - In No Acute Distress] : in no acute distress [General Appearance - Well Nourished] : well nourished [General Appearance - Well Developed] : well developed [General Appearance - Well-Appearing] : healthy appearing [Sclera] : the sclera and conjunctiva were normal [PERRL With Normal Accommodation] : pupils were equal in size, round, and reactive to light [Extraocular Movements] : extraocular movements were intact [Normal Oral Mucosa] : normal oral mucosa [No Oral Pallor] : no oral pallor [No Oral Cyanosis] : no oral cyanosis [Outer Ear] : the ears and nose were normal in appearance [Hearing Threshold Finger Rub Not [place]] : hearing was normal [Examination Of The Oral Cavity] : the lips and gums were normal [Neck Appearance] : the appearance of the neck was normal [] : no respiratory distress [Respiration, Rhythm And Depth] : normal respiratory rhythm and effort [Exaggerated Use Of Accessory Muscles For Inspiration] : no accessory muscle use [Auscultation Breath Sounds / Voice Sounds] : lungs were clear to auscultation bilaterally [Heart Sounds] : normal S1 and S2 [Heart Sounds Gallop] : no gallops [Heart Sounds Pericardial Friction Rub] : no pericardial rub [Abdomen Soft] : soft [Abdomen Tenderness] : non-tender [Cervical Lymph Nodes Enlarged Posterior Bilaterally] : posterior cervical [Abdomen Mass (___ Cm)] : no abdominal mass palpated [Cervical Lymph Nodes Enlarged Anterior Bilaterally] : anterior cervical [No CVA Tenderness] : no ~M costovertebral angle tenderness [No Spinal Tenderness] : no spinal tenderness [Abnormal Walk] : normal gait [Nail Clubbing] : no clubbing  or cyanosis of the fingernails [Musculoskeletal - Swelling] : no joint swelling seen [Involuntary Movements] : no involuntary movements were seen [Motor Tone] : muscle strength and tone were normal [FreeTextEntry1] : oa hands, [Skin Color & Pigmentation] : normal skin color and pigmentation [No Focal Deficits] : no focal deficits [Oriented To Time, Place, And Person] : oriented to person, place, and time [Impaired Insight] : insight and judgment were intact [Affect] : the affect was normal

## 2021-09-23 ENCOUNTER — APPOINTMENT (OUTPATIENT)
Dept: GERIATRICS | Facility: HOME HEALTH | Age: 86
End: 2021-09-23

## 2021-09-30 ENCOUNTER — APPOINTMENT (OUTPATIENT)
Dept: GERIATRICS | Facility: HOME HEALTH | Age: 86
End: 2021-09-30
Payer: MEDICARE

## 2021-09-30 PROCEDURE — 99348 HOME/RES VST EST LOW MDM 30: CPT | Mod: 25

## 2021-09-30 PROCEDURE — 90662 IIV NO PRSV INCREASED AG IM: CPT

## 2021-09-30 PROCEDURE — G0008: CPT

## 2021-10-02 VITALS
WEIGHT: 147.2 LBS | SYSTOLIC BLOOD PRESSURE: 115 MMHG | DIASTOLIC BLOOD PRESSURE: 70 MMHG | HEART RATE: 80 BPM | TEMPERATURE: 94 F | OXYGEN SATURATION: 100 % | BODY MASS INDEX: 24.5 KG/M2

## 2021-10-02 RX ORDER — CHOLECALCIFEROL (VITAMIN D3) 25 MCG
25 MCG TABLET ORAL
Qty: 90 | Refills: 3 | Status: ACTIVE | COMMUNITY
Start: 1900-01-01 | End: 1900-01-01

## 2021-10-02 RX ORDER — PHENOL 1.4 %
600 AEROSOL, SPRAY (ML) MUCOUS MEMBRANE DAILY
Qty: 90 | Refills: 3 | Status: ACTIVE | COMMUNITY
Start: 1900-01-01 | End: 1900-01-01

## 2021-10-02 NOTE — PHYSICAL EXAM
[General Appearance - Alert] : alert [General Appearance - In No Acute Distress] : in no acute distress [General Appearance - Well Nourished] : well nourished [General Appearance - Well Developed] : well developed [General Appearance - Well-Appearing] : healthy appearing [Sclera] : the sclera and conjunctiva were normal [PERRL With Normal Accommodation] : pupils were equal in size, round, and reactive to light [Extraocular Movements] : extraocular movements were intact [Normal Oral Mucosa] : normal oral mucosa [No Oral Pallor] : no oral pallor [No Oral Cyanosis] : no oral cyanosis [Outer Ear] : the ears and nose were normal in appearance [Hearing Threshold Finger Rub Not [place]] : hearing was normal [Examination Of The Oral Cavity] : the lips and gums were normal [Neck Appearance] : the appearance of the neck was normal [] : no respiratory distress [Respiration, Rhythm And Depth] : normal respiratory rhythm and effort [Exaggerated Use Of Accessory Muscles For Inspiration] : no accessory muscle use [Auscultation Breath Sounds / Voice Sounds] : lungs were clear to auscultation bilaterally [Heart Sounds] : normal S1 and S2 [Heart Sounds Gallop] : no gallops [Heart Sounds Pericardial Friction Rub] : no pericardial rub [Abdomen Soft] : soft [Abdomen Tenderness] : non-tender [Abdomen Mass (___ Cm)] : no abdominal mass palpated [Cervical Lymph Nodes Enlarged Posterior Bilaterally] : posterior cervical [Cervical Lymph Nodes Enlarged Anterior Bilaterally] : anterior cervical [No CVA Tenderness] : no ~M costovertebral angle tenderness [No Spinal Tenderness] : no spinal tenderness [Abnormal Walk] : normal gait [Nail Clubbing] : no clubbing  or cyanosis of the fingernails [Involuntary Movements] : no involuntary movements were seen [Musculoskeletal - Swelling] : no joint swelling seen [Motor Tone] : muscle strength and tone were normal [Skin Color & Pigmentation] : normal skin color and pigmentation [No Focal Deficits] : no focal deficits [Oriented To Time, Place, And Person] : oriented to person, place, and time [Impaired Insight] : insight and judgment were intact [Affect] : the affect was normal [de-identified] : arm is out of cast limited rom fingers - uses walker [FreeTextEntry1] : oa hands,

## 2021-10-02 NOTE — HISTORY OF PRESENT ILLNESS
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\par afib, htn\par varicose veins\par c/o decreased hearing\par \par 19\par f/up\par had labs 3/2019 wnl\par \par 19\par pt is doing well\par c/o occas jabbing pains in rt thigh\par has varicose veins\par needs all med renewed\par \par 19\par occas jabs rt thigh\par varicose veins\par not takin toprol xl 25 - ran out\par palpitations if sh walks too fast\par needs flu v\par supplied health care proxy - discussed advance directives\par \par 11/15/19\par pt c/o pinching pains in legs - shania when on his feet\par does not want pain meds\par has some shortness of breath on walking\par does wear supp hose\par \par 1/10/20\par pt continues to have occas pains in thighs  as above\par did not go to vascular - not that troublesome\par wears supp hose\par has varicose veins\par has afib - no palpitations\par no chest pain, no sob\par \par 20\par pt has been doing well\par discussed advanced directives\par pt decided on home DNR\par no chest pain\par no sob\par no palps\par \par 20\par pt is now dnr and wears a bracelet\par she is feeling well\par and is independent\par occas papitations\par sticking feeling in rt thigh unchanged\par reviewed meds\par \par 20\par pt is well\par rare palpitatons\par occas sticking feeling in thigh\par varicose veins\par reviewed meds and purpose of each\par \par 20\par pt relays one evening she had palpitaitons - did not report\par otherwise well\par less appetite in summer\par edema foot\par diminished hearing\par \par 9/3/20\par no more episodes of palpitations\par no edema\par going to dentist today - needs a filling\par needs flu vaccine today\par \par 10/29/30\par pt reports she went to eye  and was diagnosed with a retinal hole.\par she is being treated with eye drops\par she manages her own meds\par denies chest pain, palps and sob\par has been at  today and was leading procession "running around"\par \par 20\par she and sister are praying re retinal hole\par has vision loss\par LE edema at the end of the day\par occas chest pain on exertion\par \par 21\par sometimes palpitations at night or when walks quickly\par she did walk with me walker up ramp and was quick and smooth\par had walker adjusted to be higher to help with posture\par had labs 21 - wnl\par \par 21\par \par pt notes palpitations if walks fast especially on incline - uses rollator\par some brief chest pains at night\par this is stable - no increase in symptoms\par going to optho later - has hole in retina\par has distortion and spots in left eye\par \par 6/3/21\par pt needs eye surgery to repair macular hole\par needs clearance\par pt is feeling well - able to walk a flight of stairs\par ocas chest pain/gas pains - stable -\par has a fib denies - palpitation or shortness of breath\par she is alert\par forms to fill out\par \par 21\par pt saw Dr Cordova cardiologist increased toprol to 100 mg a day\par ordered echo\par on  had repair of macular hole\par  fell at 2 am going to bathroom - 8 am went to ER - casted and then reset last week\par radius fracutre\par vision is slowly improving\par staying on assisted living side\par not exerting self - trying to stay independent\par no chest pain\par \par 21\par cast is off left arm and it is healing up\par went to eye   - it is slightly better - not completely\par heart - has been stable - no chest pain nor palpitations\par needs flu vaccines\par needs meds renewed

## 2021-10-02 NOTE — ASSESSMENT
[FreeTextEntry1] : pt with htn, afib\par to get hearing checked\par doing well\par \par 5/31/19\par no changes made\par \par 7/26/19\par no changes made\par \par 9/27/19\par reviewed proxy\par flu vaccine given\par restat extra 25 mg toprol\par hr \par \par 11/151/9\par pt seems to have pain  in varicose veins - irregular patterns\par suggested vascular consult\par a fib\par mild sob on exertion\par wearing supp hose\par \par 1/10/20\par decided against vascular consult\par a fib controlled\par BP controlled\par \par 2/28/20\par signed home DNR\par renewed all meds\par \par 4/23/20\par pt is doing well\par no changes made\par \par 5/20/20\par resigned DNR\par \par 7/16/20\par pt is doing well\par cardiac stable\par cleaned ears\par doing well\par no changes in meds\par \par 9/3/20\par doing well\par flu vaccine given\par to go to dentist\par check records re if any blood tests are needed\par \par 10/29/20\par signed DNR\par optho re retinal hole\par check need for labs\par watch BP - slightly high\par heart rate well controlled\par \par 12/29/20\par pt is doing well\par [facility] cont meds\par \par 2.25.21\par renewed all meds\par labs good\par angina stable\par to see optho\par renewed DNR\par \par 4/14/21\par pt did not take toprol this am - got busy\par heart rate and BP slightly elevated\par mild stable angina\par did DNR\par to go to optho - re retinal tear\par \par 6/3/21\par pre-op clearance\par labs ordered\par EKG a fib with q wave v1-3\par compared with old ekg - 5/16/17 - q waves are new\par echo ordered\par suggested fup cardiologist\par \par vital signs are stable\par angina stable\par \par no contraindication to planned repair of macular hole\par on 7/6/21\par low risk procdeure\par \par ok to hold eliquis as per optho\par \par 7/22/21\par macular hole repaired \par now with broken radius - in cast\par BP good\par recovering well\par echo mild AS, dilated LA\par mild pulmonary htn\par on increased toprol to 100 - with good results\par \par 9/30/21\par broken arm recovering - cast off\par BP good\par HR good\par flu vaccine given\par meds renewed

## 2021-11-03 ENCOUNTER — APPOINTMENT (OUTPATIENT)
Dept: GERIATRICS | Facility: HOME HEALTH | Age: 86
End: 2021-11-03
Payer: MEDICARE

## 2021-11-03 PROCEDURE — 99348 HOME/RES VST EST LOW MDM 30: CPT

## 2021-11-09 VITALS
WEIGHT: 147 LBS | SYSTOLIC BLOOD PRESSURE: 130 MMHG | BODY MASS INDEX: 24.46 KG/M2 | TEMPERATURE: 97.1 F | HEART RATE: 75 BPM | DIASTOLIC BLOOD PRESSURE: 70 MMHG | OXYGEN SATURATION: 99 %

## 2021-11-09 NOTE — ASSESSMENT
[FreeTextEntry1] : pt with htn, afib\par to get hearing checked\par doing well\par \par 5/31/19\par no changes made\par \par 7/26/19\par no changes made\par \par 9/27/19\par reviewed proxy\par flu vaccine given\par restat extra 25 mg toprol\par hr \par \par 11/151/9\par pt seems to have pain  in varicose veins - irregular patterns\par suggested vascular consult\par a fib\par mild sob on exertion\par wearing supp hose\par \par 1/10/20\par decided against vascular consult\par a fib controlled\par BP controlled\par \par 2/28/20\par signed home DNR\par renewed all meds\par \par 4/23/20\par pt is doing well\par no changes made\par \par 5/20/20\par resigned DNR\par \par 7/16/20\par pt is doing well\par cardiac stable\par cleaned ears\par doing well\par no changes in meds\par \par 9/3/20\par doing well\par flu vaccine given\par to go to dentist\par check records re if any blood tests are needed\par \par 10/29/20\par signed DNR\par optho re retinal hole\par check need for labs\par watch BP - slightly high\par heart rate well controlled\par \par 12/29/20\par pt is doing well\par [facility]\par cont meds\par \par 2.25.21\par renewed all meds\par labs good\par angina stable\par to see optho\par renewed DNR\par \par 4/14/21\par pt did not take toprol this am - got busy\par heart rate and BP slightly elevated\par mild stable angina\par did DNR\par to go to optho - re retinal tear\par \par 6/3/21\par pre-op clearance\par labs ordered\par EKG a fib with q wave v1-3\par compared with old ekg - 5/16/17 - q waves are new\par echo ordered\par suggested fup cardiologist\par \par vital signs are stable\par angina stable\par \par no contraindication to planned repair of macular hole\par on 7/6/21\par low risk procdeure\par \par ok to hold eliquis as per optho\par \par 7/22/21\par macular hole repaired \par now with broken radius - in cast\par BP good\par recovering well\par echo mild AS, dilated LA\par mild pulmonary htn\par on increased toprol to 100 - with good results\par \par 9/30/21\par broken arm recovering - cast off\par BP good\par HR good\par flu vaccine given\par meds renewed\par \par 11/3/21\par pt is well\par BP and hr controlled\par cont same meds

## 2021-11-09 NOTE — PHYSICAL EXAM
[General Appearance - Alert] : alert [General Appearance - In No Acute Distress] : in no acute distress [General Appearance - Well Nourished] : well nourished [General Appearance - Well Developed] : well developed [General Appearance - Well-Appearing] : healthy appearing [Sclera] : the sclera and conjunctiva were normal [PERRL With Normal Accommodation] : pupils were equal in size, round, and reactive to light [Extraocular Movements] : extraocular movements were intact [Normal Oral Mucosa] : normal oral mucosa [No Oral Pallor] : no oral pallor [No Oral Cyanosis] : no oral cyanosis [Outer Ear] : the ears and nose were normal in appearance [Hearing Threshold Finger Rub Not [place]] : hearing was normal [Examination Of The Oral Cavity] : the lips and gums were normal [Neck Appearance] : the appearance of the neck was normal [] : no respiratory distress [Respiration, Rhythm And Depth] : normal respiratory rhythm and effort [Exaggerated Use Of Accessory Muscles For Inspiration] : no accessory muscle use [Auscultation Breath Sounds / Voice Sounds] : lungs were clear to auscultation bilaterally [Heart Sounds] : normal S1 and S2 [Heart Sounds Gallop] : no gallops [Heart Sounds Pericardial Friction Rub] : no pericardial rub [Abdomen Soft] : soft [Abdomen Tenderness] : non-tender [Abdomen Mass (___ Cm)] : no abdominal mass palpated [Cervical Lymph Nodes Enlarged Posterior Bilaterally] : posterior cervical [Cervical Lymph Nodes Enlarged Anterior Bilaterally] : anterior cervical [No CVA Tenderness] : no ~M costovertebral angle tenderness [No Spinal Tenderness] : no spinal tenderness [Abnormal Walk] : normal gait [Nail Clubbing] : no clubbing  or cyanosis of the fingernails [Involuntary Movements] : no involuntary movements were seen [Musculoskeletal - Swelling] : no joint swelling seen [Motor Tone] : muscle strength and tone were normal [Skin Color & Pigmentation] : normal skin color and pigmentation [No Focal Deficits] : no focal deficits [Oriented To Time, Place, And Person] : oriented to person, place, and time [Impaired Insight] : insight and judgment were intact [Affect] : the affect was normal [de-identified] : arm is out of cast limited rom fingers - uses walker [FreeTextEntry1] : oa hands,

## 2021-11-09 NOTE — REVIEW OF SYSTEMS
[Eyesight Problems] : eyesight problems [Arthralgias] : arthralgias [Negative] : Heme/Lymph [Palpitations] : palpitations [FreeTextEntry9] : rt arm in cast

## 2021-11-09 NOTE — HISTORY OF PRESENT ILLNESS
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\par afib, htn\par varicose veins\par c/o decreased hearing\par \par 19\par f/up\par had labs 3/2019 wnl\par \par 19\par pt is doing well\par c/o occas jabbing pains in rt thigh\par has varicose veins\par needs all med renewed\par \par 19\par occas jabs rt thigh\par varicose veins\par not takin toprol xl 25 - ran out\par palpitations if sh walks too fast\par needs flu v\par supplied health care proxy - discussed advance directives\par \par 11/15/19\par pt c/o pinching pains in legs - shania when on his feet\par does not want pain meds\par has some shortness of breath on walking\par does wear supp hose\par \par 1/10/20\par pt continues to have occas pains in thighs  as above\par did not go to vascular - not that troublesome\par wears supp hose\par has varicose veins\par has afib - no palpitations\par no chest pain, no sob\par \par 20\par pt has been doing well\par discussed advanced directives\par pt decided on home DNR\par no chest pain\par no sob\par no palps\par \par 20\par pt is now dnr and wears a bracelet\par she is feeling well\par and is independent\par occas papitations\par sticking feeling in rt thigh unchanged\par reviewed meds\par \par 20\par pt is well\par rare palpitatons\par occas sticking feeling in thigh\par varicose veins\par reviewed meds and purpose of each\par \par 20\par pt relays one evening she had palpitaitons - did not report\par otherwise well\par less appetite in summer\par edema foot\par diminished hearing\par \par 9/3/20\par no more episodes of palpitations\par no edema\par going to dentist today - needs a filling\par needs flu vaccine today\par \par 10/29/30\par pt reports she went to eye  and was diagnosed with a retinal hole.\par she is being treated with eye drops\par she manages her own meds\par denies chest pain, palps and sob\par has been at  today and was leading procession "running around"\par \par 20\par she and sister are praying re retinal hole\par has vision loss\par LE edema at the end of the day\par occas chest pain on exertion\par \par 21\par sometimes palpitations at night or when walks quickly\par she did walk with me walker up ramp and was quick and smooth\par had walker adjusted to be higher to help with posture\par had labs 21 - wnl\par \par 21\par \par pt notes palpitations if walks fast especially on incline - uses rollator\par some brief chest pains at night\par this is stable - no increase in symptoms\par going to optho later - has hole in retina\par has distortion and spots in left eye\par \par 6/3/21\par pt needs eye surgery to repair macular hole\par needs clearance\par pt is feeling well - able to walk a flight of stairs\par ocas chest pain/gas pains - stable -\par has a fib denies - palpitation or shortness of breath\par she is alert\par forms to fill out\par \par 21\par pt saw Dr Cordova cardiologist increased toprol to 100 mg a day\par ordered echo\par on  had repair of macular hole\par  fell at 2 am going to bathroom - 8 am went to ER - casted and then reset last week\par radius fracutre\par vision is slowly improving\par staying on assisted living side\par not exerting self - trying to stay independent\par no chest pain\par \par 21\par cast is off left arm and it is healing up\par went to eye   - it is slightly better - not completely\par heart - has been stable - no chest pain nor palpitations\par needs flu vaccines\par needs meds renewed\par \par 11/3/21\par pt is now back to her usual residence\par her left wrist is recovering - still some limited rom\par occa palpitations in bed at night\par using rollator

## 2021-12-16 ENCOUNTER — APPOINTMENT (OUTPATIENT)
Dept: GERIATRICS | Facility: HOME HEALTH | Age: 86
End: 2021-12-16
Payer: MEDICARE

## 2021-12-16 PROCEDURE — 99348 HOME/RES VST EST LOW MDM 30: CPT

## 2021-12-17 VITALS
HEART RATE: 91 BPM | TEMPERATURE: 96.8 F | OXYGEN SATURATION: 100 % | DIASTOLIC BLOOD PRESSURE: 80 MMHG | BODY MASS INDEX: 24.13 KG/M2 | WEIGHT: 145 LBS | SYSTOLIC BLOOD PRESSURE: 130 MMHG

## 2021-12-17 NOTE — ASSESSMENT
[FreeTextEntry1] : pt with htn, afib\par to get hearing checked\par doing well\par \par 5/31/19\par no changes made\par \par 7/26/19\par no changes made\par \par 9/27/19\par reviewed proxy\par flu vaccine given\par restat extra 25 mg toprol\par hr \par \par 11/151/9\par pt seems to have pain  in varicose veins - irregular patterns\par suggested vascular consult\par a fib\par mild sob on exertion\par wearing supp hose\par \par 1/10/20\par decided against vascular consult\par a fib controlled\par BP controlled\par \par 2/28/20\par signed home DNR\par renewed all meds\par \par 4/23/20\par pt is doing well\par no changes made\par \par 5/20/20\par resigned DNR\par \par 7/16/20\par pt is doing well\par cardiac stable\par cleaned ears\par doing well\par no changes in meds\par \par 9/3/20\par doing well\par flu vaccine given\par to go to dentist\par check records re if any blood tests are needed\par \par 10/29/20\par signed DNR\par optho re retinal hole\par check need for labs\par watch BP - slightly high\par heart rate well controlled\par \par 12/29/20\par pt is doing well\par [facility]\par cont meds\par \par 2.25.21\par renewed all meds\par labs good\par angina stable\par to see optho\par renewed DNR\par \par 4/14/21\par pt did not take toprol this am - got busy\par heart rate and BP slightly elevated\par mild stable angina\par did DNR\par to go to optho - re retinal tear\par \par 6/3/21\par pre-op clearance\par labs ordered\par EKG a fib with q wave v1-3\par compared with old ekg - 5/16/17 - q waves are new\par echo ordered\par suggested fup cardiologist\par \par vital signs are stable\par angina stable\par \par no contraindication to planned repair of macular hole\par on 7/6/21\par low risk procdeure\par \par ok to hold eliquis as per optho\par \par 7/22/21\par macular hole repaired \par now with broken radius - in cast\par BP good\par recovering well\par echo mild AS, dilated LA\par mild pulmonary htn\par on increased toprol to 100 - with good results\par \par 9/30/21\par broken arm recovering - cast off\par BP good\par HR good\par flu vaccine given\par meds renewed\par \par 11/3/21\par pt is well\par BP and hr controlled\par cont same meds\par \par 12/16/21\par pt has made good recovery\par afib and hypertension under control\par no changes in meds made

## 2021-12-17 NOTE — PHYSICAL EXAM
[General Appearance - Alert] : alert [General Appearance - In No Acute Distress] : in no acute distress [General Appearance - Well Nourished] : well nourished [General Appearance - Well Developed] : well developed [General Appearance - Well-Appearing] : healthy appearing [Sclera] : the sclera and conjunctiva were normal [PERRL With Normal Accommodation] : pupils were equal in size, round, and reactive to light [Extraocular Movements] : extraocular movements were intact [Normal Oral Mucosa] : normal oral mucosa [No Oral Pallor] : no oral pallor [No Oral Cyanosis] : no oral cyanosis [Outer Ear] : the ears and nose were normal in appearance [Hearing Threshold Finger Rub Not [place]] : hearing was normal [Examination Of The Oral Cavity] : the lips and gums were normal [Neck Appearance] : the appearance of the neck was normal [] : no respiratory distress [Respiration, Rhythm And Depth] : normal respiratory rhythm and effort [Exaggerated Use Of Accessory Muscles For Inspiration] : no accessory muscle use [Auscultation Breath Sounds / Voice Sounds] : lungs were clear to auscultation bilaterally [Heart Sounds] : normal S1 and S2 [Heart Sounds Gallop] : no gallops [Heart Sounds Pericardial Friction Rub] : no pericardial rub [Abdomen Soft] : soft [Abdomen Tenderness] : non-tender [Abdomen Mass (___ Cm)] : no abdominal mass palpated [Cervical Lymph Nodes Enlarged Posterior Bilaterally] : posterior cervical [Cervical Lymph Nodes Enlarged Anterior Bilaterally] : anterior cervical [No CVA Tenderness] : no ~M costovertebral angle tenderness [No Spinal Tenderness] : no spinal tenderness [Abnormal Walk] : normal gait [Nail Clubbing] : no clubbing  or cyanosis of the fingernails [Involuntary Movements] : no involuntary movements were seen [Musculoskeletal - Swelling] : no joint swelling seen [Motor Tone] : muscle strength and tone were normal [Skin Color & Pigmentation] : normal skin color and pigmentation [No Focal Deficits] : no focal deficits [Oriented To Time, Place, And Person] : oriented to person, place, and time [Impaired Insight] : insight and judgment were intact [Affect] : the affect was normal [de-identified] :  - uses walker [FreeTextEntry1] : oa hands,

## 2021-12-17 NOTE — REVIEW OF SYSTEMS
[Eyesight Problems] : eyesight problems [Arthralgias] : arthralgias [Negative] : Heme/Lymph [Palpitations] : no palpitations [FreeTextEntry9] : rt arm in cast

## 2021-12-17 NOTE — HISTORY OF PRESENT ILLNESS
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\par afib, htn\par varicose veins\par c/o decreased hearing\par \par 19\par f/up\par had labs 3/2019 wnl\par \par 19\par pt is doing well\par c/o occas jabbing pains in rt thigh\par has varicose veins\par needs all med renewed\par \par 19\par occas jabs rt thigh\par varicose veins\par not takin toprol xl 25 - ran out\par palpitations if sh walks too fast\par needs flu v\par supplied health care proxy - discussed advance directives\par \par 11/15/19\par pt c/o pinching pains in legs - shania when on his feet\par does not want pain meds\par has some shortness of breath on walking\par does wear supp hose\par \par 1/10/20\par pt continues to have occas pains in thighs  as above\par did not go to vascular - not that troublesome\par wears supp hose\par has varicose veins\par has afib - no palpitations\par no chest pain, no sob\par \par 20\par pt has been doing well\par discussed advanced directives\par pt decided on home DNR\par no chest pain\par no sob\par no palps\par \par 20\par pt is now dnr and wears a bracelet\par she is feeling well\par and is independent\par occas papitations\par sticking feeling in rt thigh unchanged\par reviewed meds\par \par 20\par pt is well\par rare palpitatons\par occas sticking feeling in thigh\par varicose veins\par reviewed meds and purpose of each\par \par 20\par pt relays one evening she had palpitaitons - did not report\par otherwise well\par less appetite in summer\par edema foot\par diminished hearing\par \par 9/3/20\par no more episodes of palpitations\par no edema\par going to dentist today - needs a filling\par needs flu vaccine today\par \par 10/29/30\par pt reports she went to eye  and was diagnosed with a retinal hole.\par she is being treated with eye drops\par she manages her own meds\par denies chest pain, palps and sob\par has been at  today and was leading procession "running around"\par \par 20\par she and sister are praying re retinal hole\par has vision loss\par LE edema at the end of the day\par occas chest pain on exertion\par \par 21\par sometimes palpitations at night or when walks quickly\par she did walk with me walker up ramp and was quick and smooth\par had walker adjusted to be higher to help with posture\par had labs 21 - wnl\par \par 21\par \par pt notes palpitations if walks fast especially on incline - uses rollator\par some brief chest pains at night\par this is stable - no increase in symptoms\par going to optho later - has hole in retina\par has distortion and spots in left eye\par \par 6/3/21\par pt needs eye surgery to repair macular hole\par needs clearance\par pt is feeling well - able to walk a flight of stairs\par ocas chest pain/gas pains - stable -\par has a fib denies - palpitation or shortness of breath\par she is alert\par forms to fill out\par \par 21\par pt saw Dr Cordova cardiologist increased toprol to 100 mg a day\par ordered echo\par on  had repair of macular hole\par  fell at 2 am going to bathroom - 8 am went to ER - casted and then reset last week\par radius fracutre\par vision is slowly improving\par staying on assisted living side\par not exerting self - trying to stay independent\par no chest pain\par \par 21\par cast is off left arm and it is healing up\par went to eye   - it is slightly better - not completely\par heart - has been stable - no chest pain nor palpitations\par needs flu vaccines\par needs meds renewed\par \par 11/3/21\par pt is now back to her usual residence\par her left wrist is recovering - still some limited rom\par occa palpitations in bed at night\par using rollator\par \par 21\par 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

## 2022-01-05 ENCOUNTER — NON-APPOINTMENT (OUTPATIENT)
Age: 87
End: 2022-01-05

## 2022-01-05 ENCOUNTER — APPOINTMENT (OUTPATIENT)
Dept: CARDIOLOGY | Facility: CLINIC | Age: 87
End: 2022-01-05
Payer: MEDICARE

## 2022-01-05 VITALS
SYSTOLIC BLOOD PRESSURE: 128 MMHG | WEIGHT: 145 LBS | DIASTOLIC BLOOD PRESSURE: 90 MMHG | BODY MASS INDEX: 24.13 KG/M2 | HEART RATE: 93 BPM

## 2022-01-05 DIAGNOSIS — R07.9 CHEST PAIN, UNSPECIFIED: ICD-10-CM

## 2022-01-05 DIAGNOSIS — S52.109A UNSPECIFIED FRACTURE OF UPPER END OF UNSPECIFIED RADIUS, INITIAL ENCOUNTER FOR CLOSED FRACTURE: ICD-10-CM

## 2022-01-05 PROCEDURE — 36415 COLL VENOUS BLD VENIPUNCTURE: CPT

## 2022-01-05 PROCEDURE — 99072 ADDL SUPL MATRL&STAF TM PHE: CPT

## 2022-01-05 PROCEDURE — 93000 ELECTROCARDIOGRAM COMPLETE: CPT

## 2022-01-05 PROCEDURE — 99214 OFFICE O/P EST MOD 30 MIN: CPT

## 2022-01-05 NOTE — HISTORY OF PRESENT ILLNESS
[FreeTextEntry1] : This 89 year old sister is referred by Dr Ramos, preop- she had   eye surgery on 7/6/2021.\par She was seen here last in 2017, followed for atrial fibrillation.\par \par \par She denies dyspnea, or syncope.\par She has had palpitations at night 'my heart is pounding away" every night.\par She has had chest pressure with exertion, none recently but does have LASSITER if she does too much ( which she usually doesn't)\par \par

## 2022-01-05 NOTE — CARDIOLOGY SUMMARY
[de-identified] : 1/5/2022  -atrial fibrillation, possible old asmi [de-identified] : nuclear 2016- normal, ef 72% [de-identified] : 6/9/2021-severely dilated RA/LA, lbh,mild mr severe TR. moderate SD [de-identified] : chest ct 2018- coronary calcifications aorta 4.3 cm

## 2022-01-27 ENCOUNTER — APPOINTMENT (OUTPATIENT)
Dept: GERIATRICS | Facility: HOME HEALTH | Age: 87
End: 2022-01-27
Payer: MEDICARE

## 2022-01-27 VITALS
TEMPERATURE: 95 F | OXYGEN SATURATION: 100 % | BODY MASS INDEX: 24.36 KG/M2 | WEIGHT: 146.4 LBS | SYSTOLIC BLOOD PRESSURE: 120 MMHG | DIASTOLIC BLOOD PRESSURE: 70 MMHG | HEART RATE: 96 BPM

## 2022-01-27 PROCEDURE — 99349 HOME/RES VST EST MOD MDM 40: CPT

## 2022-01-27 NOTE — HISTORY OF PRESENT ILLNESS
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\par afib, htn\par varicose veins\par c/o decreased hearing\par \par 19\par f/up\par had labs 3/2019 wnl\par \par 19\par pt is doing well\par c/o occas jabbing pains in rt thigh\par has varicose veins\par needs all med renewed\par \par 19\par occas jabs rt thigh\par varicose veins\par not takin toprol xl 25 - ran out\par palpitations if sh walks too fast\par needs flu v\par supplied health care proxy - discussed advance directives\par \par 11/15/19\par pt c/o pinching pains in legs - shania when on his feet\par does not want pain meds\par has some shortness of breath on walking\par does wear supp hose\par \par 1/10/20\par pt continues to have occas pains in thighs  as above\par did not go to vascular - not that troublesome\par wears supp hose\par has varicose veins\par has afib - no palpitations\par no chest pain, no sob\par \par 20\par pt has been doing well\par discussed advanced directives\par pt decided on home DNR\par no chest pain\par no sob\par no palps\par \par 20\par pt is now dnr and wears a bracelet\par she is feeling well\par and is independent\par occas papitations\par sticking feeling in rt thigh unchanged\par reviewed meds\par \par 20\par pt is well\par rare palpitatons\par occas sticking feeling in thigh\par varicose veins\par reviewed meds and purpose of each\par \par 20\par pt relays one evening she had palpitaitons - did not report\par otherwise well\par less appetite in summer\par edema foot\par diminished hearing\par \par 9/3/20\par no more episodes of palpitations\par no edema\par going to dentist today - needs a filling\par needs flu vaccine today\par \par 10/29/30\par pt reports she went to eye  and was diagnosed with a retinal hole.\par she is being treated with eye drops\par she manages her own meds\par denies chest pain, palps and sob\par has been at  today and was leading procession "running around"\par \par 20\par she and sister are praying re retinal hole\par has vision loss\par LE edema at the end of the day\par occas chest pain on exertion\par \par 21\par sometimes palpitations at night or when walks quickly\par she did walk with me walker up ramp and was quick and smooth\par had walker adjusted to be higher to help with posture\par had labs 21 - wnl\par \par 21\par \par pt notes palpitations if walks fast especially on incline - uses rollator\par some brief chest pains at night\par this is stable - no increase in symptoms\par going to optho later - has hole in retina\par has distortion and spots in left eye\par \par 6/3/21\par pt needs eye surgery to repair macular hole\par needs clearance\par pt is feeling well - able to walk a flight of stairs\par ocas chest pain/gas pains - stable -\par has a fib denies - palpitation or shortness of breath\par she is alert\par forms to fill out\par \par 21\par pt saw Dr Cordova cardiologist increased toprol to 100 mg a day\par ordered echo\par on  had repair of macular hole\par  fell at 2 am going to bathroom - 8 am went to ER - casted and then reset last week\par radius fracutre\par vision is slowly improving\par staying on assisted living side\par not exerting self - trying to stay independent\par no chest pain\par \par 21\par cast is off left arm and it is healing up\par went to eye   - it is slightly better - not completely\par heart - has been stable - no chest pain nor palpitations\par needs flu vaccines\par needs meds renewed\par \par 11/3/21\par pt is now back to her usual residence\par her left wrist is recovering - still some limited rom\par occa palpitations in bed at night\par using rollator\par \par 21\par 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

## 2022-01-27 NOTE — ASSESSMENT
[FreeTextEntry1] : pt with htn, afib\par to get hearing checked\par doing well\par \par 5/31/19\par no changes made\par \par 7/26/19\par no changes made\par \par 9/27/19\par reviewed proxy\par flu vaccine given\par restat extra 25 mg toprol\par hr \par \par 11/151/9\par pt seems to have pain  in varicose veins - irregular patterns\par suggested vascular consult\par a fib\par mild sob on exertion\par wearing supp hose\par \par 1/10/20\par decided against vascular consult\par a fib controlled\par BP controlled\par \par 2/28/20\par signed home DNR\par renewed all meds\par \par 4/23/20\par pt is doing well\par no changes made\par \par 5/20/20\par resigned DNR\par \par 7/16/20\par pt is doing well\par cardiac stable\par cleaned ears\par doing well\par no changes in meds\par \par 9/3/20\par doing well\par flu vaccine given\par to go to dentist\par check records re if any blood tests are needed\par \par 10/29/20\par signed DNR\par optho re retinal hole\par check need for labs\par watch BP - slightly high\par heart rate well controlled\par \par 12/29/20\par pt is doing well\par [facility]\par cont meds\par \par 2.25.21\par renewed all meds\par labs good\par angina stable\par to see optho\par renewed DNR\par \par 4/14/21\par pt did not take toprol this am - got busy\par heart rate and BP slightly elevated\par mild stable angina\par did DNR\par to go to optho - re retinal tear\par \par 6/3/21\par pre-op clearance\par labs ordered\par EKG a fib with q wave v1-3\par compared with old ekg - 5/16/17 - q waves are new\par echo ordered\par suggested fup cardiologist\par \par vital signs are stable\par angina stable\par \par no contraindication to planned repair of macular hole\par on 7/6/21\par low risk procdeure\par \par ok to hold eliquis as per optho\par \par 7/22/21\par macular hole repaired \par now with broken radius - in cast\par BP good\par recovering well\par echo mild AS, dilated LA\par mild pulmonary htn\par on increased toprol to 100 - with good results\par \par 9/30/21\par broken arm recovering - cast off\par BP good\par HR good\par flu vaccine given\par meds renewed\par \par 11/3/21\par pt is well\par BP and hr controlled\par cont same meds\par \par 12/16/21\par pt has made good recovery\par afib and hypertension under control\par no changes in meds made\par \par 1/27/22\par pt has a fib and htn well controlled\par has OA - shoulder\par left calf swelling - is on eliquis\par so doubt dvt\par she is mobile

## 2022-01-27 NOTE — PHYSICAL EXAM
[General Appearance - Alert] : alert [General Appearance - In No Acute Distress] : in no acute distress [General Appearance - Well Nourished] : well nourished [General Appearance - Well Developed] : well developed [General Appearance - Well-Appearing] : healthy appearing [Sclera] : the sclera and conjunctiva were normal [PERRL With Normal Accommodation] : pupils were equal in size, round, and reactive to light [Extraocular Movements] : extraocular movements were intact [Normal Oral Mucosa] : normal oral mucosa [No Oral Pallor] : no oral pallor [No Oral Cyanosis] : no oral cyanosis [Outer Ear] : the ears and nose were normal in appearance [Hearing Threshold Finger Rub Not [place]] : hearing was normal [Examination Of The Oral Cavity] : the lips and gums were normal [Neck Appearance] : the appearance of the neck was normal [] : no respiratory distress [Respiration, Rhythm And Depth] : normal respiratory rhythm and effort [Exaggerated Use Of Accessory Muscles For Inspiration] : no accessory muscle use [Auscultation Breath Sounds / Voice Sounds] : lungs were clear to auscultation bilaterally [Heart Sounds] : normal S1 and S2 [Heart Sounds Gallop] : no gallops [Heart Sounds Pericardial Friction Rub] : no pericardial rub [Abdomen Soft] : soft [Abdomen Tenderness] : non-tender [Abdomen Mass (___ Cm)] : no abdominal mass palpated [Cervical Lymph Nodes Enlarged Posterior Bilaterally] : posterior cervical [Cervical Lymph Nodes Enlarged Anterior Bilaterally] : anterior cervical [No CVA Tenderness] : no ~M costovertebral angle tenderness [No Spinal Tenderness] : no spinal tenderness [Abnormal Walk] : normal gait [Nail Clubbing] : no clubbing  or cyanosis of the fingernails [Involuntary Movements] : no involuntary movements were seen [Musculoskeletal - Swelling] : no joint swelling seen [Motor Tone] : muscle strength and tone were normal [Skin Color & Pigmentation] : normal skin color and pigmentation [No Focal Deficits] : no focal deficits [Oriented To Time, Place, And Person] : oriented to person, place, and time [Impaired Insight] : insight and judgment were intact [Affect] : the affect was normal [de-identified] :  - uses walker [de-identified] : left calf swollen, painful, not red [FreeTextEntry1] : oa hands,

## 2022-03-17 ENCOUNTER — APPOINTMENT (OUTPATIENT)
Dept: GERIATRICS | Facility: HOME HEALTH | Age: 87
End: 2022-03-17
Payer: MEDICARE

## 2022-03-17 VITALS
SYSTOLIC BLOOD PRESSURE: 130 MMHG | OXYGEN SATURATION: 100 % | BODY MASS INDEX: 24.03 KG/M2 | DIASTOLIC BLOOD PRESSURE: 72 MMHG | WEIGHT: 144.4 LBS | HEART RATE: 92 BPM | TEMPERATURE: 96.9 F

## 2022-03-17 PROCEDURE — 99349 HOME/RES VST EST MOD MDM 40: CPT

## 2022-03-17 NOTE — ASSESSMENT
[FreeTextEntry1] : pt with htn, afib\par to get hearing checked\par doing well\par \par 5/31/19\par no changes made\par \par 7/26/19\par no changes made\par \par 9/27/19\par reviewed proxy\par flu vaccine given\par restat extra 25 mg toprol\par hr \par \par 11/151/9\par pt seems to have pain  in varicose veins - irregular patterns\par suggested vascular consult\par a fib\par mild sob on exertion\par wearing supp hose\par \par 1/10/20\par decided against vascular consult\par a fib controlled\par BP controlled\par \par 2/28/20\par signed home DNR\par renewed all meds\par \par 4/23/20\par pt is doing well\par no changes made\par \par 5/20/20\par resigned DNR\par \par 7/16/20\par pt is doing well\par cardiac stable\par cleaned ears\par doing well\par no changes in meds\par \par 9/3/20\par doing well\par flu vaccine given\par to go to dentist\par check records re if any blood tests are needed\par \par 10/29/20\par signed DNR\par optho re retinal hole\par check need for labs\par watch BP - slightly high\par heart rate well controlled\par \par 12/29/20\par pt is doing well\par [facility]\par cont meds\par \par 2.25.21\par renewed all meds\par labs good\par angina stable\par to see optho\par renewed DNR\par \par 4/14/21\par pt did not take toprol this am - got busy\par heart rate and BP slightly elevated\par mild stable angina\par did DNR\par to go to optho - re retinal tear\par \par 6/3/21\par pre-op clearance\par labs ordered\par EKG a fib with q wave v1-3\par compared with old ekg - 5/16/17 - q waves are new\par echo ordered\par suggested fup cardiologist\par \par vital signs are stable\par angina stable\par \par no contraindication to planned repair of macular hole\par on 7/6/21\par low risk procdeure\par \par ok to hold eliquis as per optho\par \par 7/22/21\par macular hole repaired \par now with broken radius - in cast\par BP good\par recovering well\par echo mild AS, dilated LA\par mild pulmonary htn\par on increased toprol to 100 - with good results\par \par 9/30/21\par broken arm recovering - cast off\par BP good\par HR good\par flu vaccine given\par meds renewed\par \par 11/3/21\par pt is well\par BP and hr controlled\par cont same meds\par \par 12/16/21\par pt has made good recovery\par afib and hypertension under control\par no changes in meds made\par \par 1/27/22\par pt has a fib and htn well controlled\par has OA - shoulder\par left calf swelling - is on eliquis\par so doubt dvt\par she is mobile\par \par 3/17/22\par pt is well \par renewed lipitor\par renewed home dnr\par labs in Jan 22 - wnl\par saw cardiologist\par no changes made

## 2022-03-17 NOTE — PHYSICAL EXAM
[General Appearance - Alert] : alert [General Appearance - In No Acute Distress] : in no acute distress [General Appearance - Well Nourished] : well nourished [General Appearance - Well Developed] : well developed [General Appearance - Well-Appearing] : healthy appearing [Sclera] : the sclera and conjunctiva were normal [PERRL With Normal Accommodation] : pupils were equal in size, round, and reactive to light [Extraocular Movements] : extraocular movements were intact [Normal Oral Mucosa] : normal oral mucosa [No Oral Pallor] : no oral pallor [No Oral Cyanosis] : no oral cyanosis [Outer Ear] : the ears and nose were normal in appearance [Hearing Threshold Finger Rub Not [place]] : hearing was normal [Examination Of The Oral Cavity] : the lips and gums were normal [Neck Appearance] : the appearance of the neck was normal [] : no respiratory distress [Respiration, Rhythm And Depth] : normal respiratory rhythm and effort [Exaggerated Use Of Accessory Muscles For Inspiration] : no accessory muscle use [Auscultation Breath Sounds / Voice Sounds] : lungs were clear to auscultation bilaterally [Heart Sounds] : normal S1 and S2 [Heart Sounds Gallop] : no gallops [Heart Sounds Pericardial Friction Rub] : no pericardial rub [Abdomen Soft] : soft [Abdomen Tenderness] : non-tender [Abdomen Mass (___ Cm)] : no abdominal mass palpated [Cervical Lymph Nodes Enlarged Posterior Bilaterally] : posterior cervical [Cervical Lymph Nodes Enlarged Anterior Bilaterally] : anterior cervical [No CVA Tenderness] : no ~M costovertebral angle tenderness [No Spinal Tenderness] : no spinal tenderness [Abnormal Walk] : normal gait [Nail Clubbing] : no clubbing  or cyanosis of the fingernails [Involuntary Movements] : no involuntary movements were seen [Musculoskeletal - Swelling] : no joint swelling seen [Motor Tone] : muscle strength and tone were normal [Skin Color & Pigmentation] : normal skin color and pigmentation [No Focal Deficits] : no focal deficits [Oriented To Time, Place, And Person] : oriented to person, place, and time [Impaired Insight] : insight and judgment were intact [Affect] : the affect was normal [de-identified] :  - uses walker [de-identified] : bilateral ankle edema _+1 varicose veins [FreeTextEntry1] : oa hands,

## 2022-03-17 NOTE — HISTORY OF PRESENT ILLNESS
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\par afib, htn\par varicose veins\par c/o decreased hearing\par \par 19\par f/up\par had labs 3/2019 wnl\par \par 19\par pt is doing well\par c/o occas jabbing pains in rt thigh\par has varicose veins\par needs all med renewed\par \par 19\par occas jabs rt thigh\par varicose veins\par not takin toprol xl 25 - ran out\par palpitations if sh walks too fast\par needs flu v\par supplied health care proxy - discussed advance directives\par \par 11/15/19\par pt c/o pinching pains in legs - shania when on his feet\par does not want pain meds\par has some shortness of breath on walking\par does wear supp hose\par \par 1/10/20\par pt continues to have occas pains in thighs  as above\par did not go to vascular - not that troublesome\par wears supp hose\par has varicose veins\par has afib - no palpitations\par no chest pain, no sob\par \par 20\par pt has been doing well\par discussed advanced directives\par pt decided on home DNR\par no chest pain\par no sob\par no palps\par \par 20\par pt is now dnr and wears a bracelet\par she is feeling well\par and is independent\par occas papitations\par sticking feeling in rt thigh unchanged\par reviewed meds\par \par 20\par pt is well\par rare palpitatons\par occas sticking feeling in thigh\par varicose veins\par reviewed meds and purpose of each\par \par 20\par pt relays one evening she had palpitaitons - did not report\par otherwise well\par less appetite in summer\par edema foot\par diminished hearing\par \par 9/3/20\par no more episodes of palpitations\par no edema\par going to dentist today - needs a filling\par needs flu vaccine today\par \par 10/29/30\par pt reports she went to eye  and was diagnosed with a retinal hole.\par she is being treated with eye drops\par she manages her own meds\par denies chest pain, palps and sob\par has been at  today and was leading procession "running around"\par \par 20\par she and sister are praying re retinal hole\par has vision loss\par LE edema at the end of the day\par occas chest pain on exertion\par \par 21\par sometimes palpitations at night or when walks quickly\par she did walk with me walker up ramp and was quick and smooth\par had walker adjusted to be higher to help with posture\par had labs 21 - wnl\par \par 21\par \par pt notes palpitations if walks fast especially on incline - uses rollator\par some brief chest pains at night\par this is stable - no increase in symptoms\par going to optho later - has hole in retina\par has distortion and spots in left eye\par \par 6/3/21\par pt needs eye surgery to repair macular hole\par needs clearance\par pt is feeling well - able to walk a flight of stairs\par ocas chest pain/gas pains - stable -\par has a fib denies - palpitation or shortness of breath\par she is alert\par forms to fill out\par \par 21\par pt saw Dr Cordova cardiologist increased toprol to 100 mg a day\par ordered echo\par on  had repair of macular hole\par  fell at 2 am going to bathroom - 8 am went to ER - casted and then reset last week\par radius fracutre\par vision is slowly improving\par staying on assisted living side\par not exerting self - trying to stay independent\par no chest pain\par \par 21\par cast is off left arm and it is healing up\par went to eye   - it is slightly better - not completely\par heart - has been stable - no chest pain nor palpitations\par needs flu vaccines\par needs meds renewed\par \par 11/3/21\par pt is now back to her usual residence\par her left wrist is recovering - still some limited rom\par occa palpitations in bed at night\par using rollator\par \par 21\par 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\par \par 22\par pt saw cardiologist for check up\par all ok\par labs pending - only cbc back\par c/o rt shoulder pain since yesterday\par left calf pain and swelling\par declines PT/heat / tylenol\par \par left eye better since surgery\par still diminished vision but better\par \par left wrist recovered\par \par 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

## 2022-03-17 NOTE — REVIEW OF SYSTEMS
[Eyesight Problems] : eyesight problems [Arthralgias] : arthralgias [Negative] : Heme/Lymph [Palpitations] : no palpitations [FreeTextEntry5] : palpitations on exertion [FreeTextEntry9] : lt wrist in brace

## 2022-05-10 ENCOUNTER — RX RENEWAL (OUTPATIENT)
Age: 87
End: 2022-05-10

## 2022-05-19 ENCOUNTER — APPOINTMENT (OUTPATIENT)
Dept: GERIATRICS | Facility: HOME HEALTH | Age: 87
End: 2022-05-19
Payer: MEDICARE

## 2022-05-19 PROCEDURE — 99349 HOME/RES VST EST MOD MDM 40: CPT

## 2022-05-22 VITALS — DIASTOLIC BLOOD PRESSURE: 80 MMHG | SYSTOLIC BLOOD PRESSURE: 140 MMHG

## 2022-05-22 VITALS — WEIGHT: 142 LBS | OXYGEN SATURATION: 100 % | HEART RATE: 91 BPM | BODY MASS INDEX: 23.63 KG/M2

## 2022-05-22 NOTE — PHYSICAL EXAM
[General Appearance - Alert] : alert [General Appearance - In No Acute Distress] : in no acute distress [General Appearance - Well Nourished] : well nourished [General Appearance - Well Developed] : well developed [General Appearance - Well-Appearing] : healthy appearing [Sclera] : the sclera and conjunctiva were normal [PERRL With Normal Accommodation] : pupils were equal in size, round, and reactive to light [Extraocular Movements] : extraocular movements were intact [Normal Oral Mucosa] : normal oral mucosa [No Oral Pallor] : no oral pallor [No Oral Cyanosis] : no oral cyanosis [Outer Ear] : the ears and nose were normal in appearance [Hearing Threshold Finger Rub Not [place]] : hearing was normal [Examination Of The Oral Cavity] : the lips and gums were normal [Neck Appearance] : the appearance of the neck was normal [] : no respiratory distress [Respiration, Rhythm And Depth] : normal respiratory rhythm and effort [Exaggerated Use Of Accessory Muscles For Inspiration] : no accessory muscle use [Auscultation Breath Sounds / Voice Sounds] : lungs were clear to auscultation bilaterally [Heart Sounds] : normal S1 and S2 [Heart Sounds Gallop] : no gallops [Heart Sounds Pericardial Friction Rub] : no pericardial rub [Abdomen Soft] : soft [Abdomen Tenderness] : non-tender [Abdomen Mass (___ Cm)] : no abdominal mass palpated [Cervical Lymph Nodes Enlarged Posterior Bilaterally] : posterior cervical [Cervical Lymph Nodes Enlarged Anterior Bilaterally] : anterior cervical [No CVA Tenderness] : no ~M costovertebral angle tenderness [No Spinal Tenderness] : no spinal tenderness [Abnormal Walk] : normal gait [Nail Clubbing] : no clubbing  or cyanosis of the fingernails [Involuntary Movements] : no involuntary movements were seen [Musculoskeletal - Swelling] : no joint swelling seen [Motor Tone] : muscle strength and tone were normal [Skin Color & Pigmentation] : normal skin color and pigmentation [No Focal Deficits] : no focal deficits [Oriented To Time, Place, And Person] : oriented to person, place, and time [Impaired Insight] : insight and judgment were intact [Affect] : the affect was normal [de-identified] : walking independently [de-identified] : bilateral ankle edema _+1 varicose veins [FreeTextEntry1] : oa hands,

## 2022-05-22 NOTE — ASSESSMENT
[FreeTextEntry1] : pt with htn, afib\par to get hearing checked\par doing well\par \par 5/31/19\par no changes made\par \par 7/26/19\par no changes made\par \par 9/27/19\par reviewed proxy\par flu vaccine given\par restat extra 25 mg toprol\par hr \par \par 11/151/9\par pt seems to have pain  in varicose veins - irregular patterns\par suggested vascular consult\par a fib\par mild sob on exertion\par wearing supp hose\par \par 1/10/20\par decided against vascular consult\par a fib controlled\par BP controlled\par \par 2/28/20\par signed home DNR\par renewed all meds\par \par 4/23/20\par pt is doing well\par no changes made\par \par 5/20/20\par resigned DNR\par \par 7/16/20\par pt is doing well\par cardiac stable\par cleaned ears\par doing well\par no changes in meds\par \par 9/3/20\par doing well\par flu vaccine given\par to go to dentist\par check records re if any blood tests are needed\par \par 10/29/20\par signed DNR\par optho re retinal hole\par check need for labs\par watch BP - slightly high\par heart rate well controlled\par \par 12/29/20\par pt is doing well\par [facility]\par cont meds\par \par 2.25.21\par renewed all meds\par labs good\par angina stable\par to see optho\par renewed DNR\par \par 4/14/21\par pt did not take toprol this am - got busy\par heart rate and BP slightly elevated\par mild stable angina\par did DNR\par to go to optho - re retinal tear\par \par 6/3/21\par pre-op clearance\par labs ordered\par EKG a fib with q wave v1-3\par compared with old ekg - 5/16/17 - q waves are new\par echo ordered\par suggested fup cardiologist\par \par vital signs are stable\par angina stable\par \par no contraindication to planned repair of macular hole\par on 7/6/21\par low risk procdeure\par \par ok to hold eliquis as per optho\par \par 7/22/21\par macular hole repaired \par now with broken radius - in cast\par BP good\par recovering well\par echo mild AS, dilated LA\par mild pulmonary htn\par on increased toprol to 100 - with good results\par \par 9/30/21\par broken arm recovering - cast off\par BP good\par HR good\par flu vaccine given\par meds renewed\par \par 11/3/21\par pt is well\par BP and hr controlled\par cont same meds\par \par 12/16/21\par pt has made good recovery\par afib and hypertension under control\par no changes in meds made\par \par 1/27/22\par pt has a fib and htn well controlled\par has OA - shoulder\par left calf swelling - is on eliquis\par so doubt dvt\par she is mobile\par \par 3/17/22\par pt is well \par renewed lipitor\par renewed home dnr\par labs in Jan 22 - wnl\par saw cardiologist\par no changes made\par \par 5/19/22\par signed home DNR\par pt agrees\par a fib- under control\par bp controlled

## 2022-05-22 NOTE — HISTORY OF PRESENT ILLNESS
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\par afib, htn\par varicose veins\par c/o decreased hearing\par \par 19\par f/up\par had labs 3/2019 wnl\par \par 19\par pt is doing well\par c/o occas jabbing pains in rt thigh\par has varicose veins\par needs all med renewed\par \par 19\par occas jabs rt thigh\par varicose veins\par not takin toprol xl 25 - ran out\par palpitations if sh walks too fast\par needs flu v\par supplied health care proxy - discussed advance directives\par \par 11/15/19\par pt c/o pinching pains in legs - shania when on his feet\par does not want pain meds\par has some shortness of breath on walking\par does wear supp hose\par \par 1/10/20\par pt continues to have occas pains in thighs  as above\par did not go to vascular - not that troublesome\par wears supp hose\par has varicose veins\par has afib - no palpitations\par no chest pain, no sob\par \par 20\par pt has been doing well\par discussed advanced directives\par pt decided on home DNR\par no chest pain\par no sob\par no palps\par \par 20\par pt is now dnr and wears a bracelet\par she is feeling well\par and is independent\par occas papitations\par sticking feeling in rt thigh unchanged\par reviewed meds\par \par 20\par pt is well\par rare palpitatons\par occas sticking feeling in thigh\par varicose veins\par reviewed meds and purpose of each\par \par 20\par pt relays one evening she had palpitaitons - did not report\par otherwise well\par less appetite in summer\par edema foot\par diminished hearing\par \par 9/3/20\par no more episodes of palpitations\par no edema\par going to dentist today - needs a filling\par needs flu vaccine today\par \par 10/29/30\par pt reports she went to eye  and was diagnosed with a retinal hole.\par she is being treated with eye drops\par she manages her own meds\par denies chest pain, palps and sob\par has been at  today and was leading procession "running around"\par \par 20\par she and sister are praying re retinal hole\par has vision loss\par LE edema at the end of the day\par occas chest pain on exertion\par \par 21\par sometimes palpitations at night or when walks quickly\par she did walk with me walker up ramp and was quick and smooth\par had walker adjusted to be higher to help with posture\par had labs 21 - wnl\par \par 21\par \par pt notes palpitations if walks fast especially on incline - uses rollator\par some brief chest pains at night\par this is stable - no increase in symptoms\par going to optho later - has hole in retina\par has distortion and spots in left eye\par \par 6/3/21\par pt needs eye surgery to repair macular hole\par needs clearance\par pt is feeling well - able to walk a flight of stairs\par ocas chest pain/gas pains - stable -\par has a fib denies - palpitation or shortness of breath\par she is alert\par forms to fill out\par \par 21\par pt saw Dr Cordova cardiologist increased toprol to 100 mg a day\par ordered echo\par on  had repair of macular hole\par  fell at 2 am going to bathroom - 8 am went to ER - casted and then reset last week\par radius fracutre\par vision is slowly improving\par staying on assisted living side\par not exerting self - trying to stay independent\par no chest pain\par \par 21\par cast is off left arm and it is healing up\par went to eye   - it is slightly better - not completely\par heart - has been stable - no chest pain nor palpitations\par needs flu vaccines\par needs meds renewed\par \par 11/3/21\par pt is now back to her usual residence\par her left wrist is recovering - still some limited rom\par occa palpitations in bed at night\par using rollator\par \par 21\par 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\par \par 22\par pt saw cardiologist for check up\par all ok\par labs pending - only cbc back\par c/o rt shoulder pain since yesterday\par left calf pain and swelling\par declines PT/heat / tylenol\par \par left eye better since surgery\par still diminished vision but better\par \par left wrist recovered\par \par 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

## 2022-05-22 NOTE — REVIEW OF SYSTEMS
[Eyesight Problems] : eyesight problems [Arthralgias] : arthralgias [Negative] : Heme/Lymph [Joint Stiffness] : joint stiffness [Palpitations] : no palpitations [FreeTextEntry5] : palpitations on exertion [FreeTextEntry9] : left wrist

## 2022-06-30 ENCOUNTER — APPOINTMENT (OUTPATIENT)
Dept: GERIATRICS | Facility: HOME HEALTH | Age: 87
End: 2022-06-30

## 2022-06-30 VITALS
OXYGEN SATURATION: 100 % | SYSTOLIC BLOOD PRESSURE: 140 MMHG | DIASTOLIC BLOOD PRESSURE: 80 MMHG | WEIGHT: 143.88 LBS | TEMPERATURE: 96.6 F | BODY MASS INDEX: 23.94 KG/M2 | HEART RATE: 72 BPM

## 2022-06-30 DIAGNOSIS — H33.329 ROUND HOLE, UNSPECIFIED EYE: ICD-10-CM

## 2022-06-30 PROCEDURE — 99349 HOME/RES VST EST MOD MDM 40: CPT

## 2022-06-30 NOTE — REVIEW OF SYSTEMS
[Eyesight Problems] : eyesight problems [Arthralgias] : arthralgias [Joint Stiffness] : joint stiffness [Negative] : Heme/Lymph [Palpitations] : no palpitations [FreeTextEntry5] : palpitations on exertion [FreeTextEntry9] : left wrist

## 2022-06-30 NOTE — HISTORY OF PRESENT ILLNESS
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\par afib, htn\par varicose veins\par c/o decreased hearing\par \par 19\par f/up\par had labs 3/2019 wnl\par \par 19\par pt is doing well\par c/o occas jabbing pains in rt thigh\par has varicose veins\par needs all med renewed\par \par 19\par occas jabs rt thigh\par varicose veins\par not takin toprol xl 25 - ran out\par palpitations if sh walks too fast\par needs flu v\par supplied health care proxy - discussed advance directives\par \par 11/15/19\par pt c/o pinching pains in legs - shania when on his feet\par does not want pain meds\par has some shortness of breath on walking\par does wear supp hose\par \par 1/10/20\par pt continues to have occas pains in thighs  as above\par did not go to vascular - not that troublesome\par wears supp hose\par has varicose veins\par has afib - no palpitations\par no chest pain, no sob\par \par 20\par pt has been doing well\par discussed advanced directives\par pt decided on home DNR\par no chest pain\par no sob\par no palps\par \par 20\par pt is now dnr and wears a bracelet\par she is feeling well\par and is independent\par occas papitations\par sticking feeling in rt thigh unchanged\par reviewed meds\par \par 20\par pt is well\par rare palpitatons\par occas sticking feeling in thigh\par varicose veins\par reviewed meds and purpose of each\par \par 20\par pt relays one evening she had palpitaitons - did not report\par otherwise well\par less appetite in summer\par edema foot\par diminished hearing\par \par 9/3/20\par no more episodes of palpitations\par no edema\par going to dentist today - needs a filling\par needs flu vaccine today\par \par 10/29/30\par pt reports she went to eye  and was diagnosed with a retinal hole.\par she is being treated with eye drops\par she manages her own meds\par denies chest pain, palps and sob\par has been at  today and was leading procession "running around"\par \par 20\par she and sister are praying re retinal hole\par has vision loss\par LE edema at the end of the day\par occas chest pain on exertion\par \par 21\par sometimes palpitations at night or when walks quickly\par she did walk with me walker up ramp and was quick and smooth\par had walker adjusted to be higher to help with posture\par had labs 21 - wnl\par \par 21\par \par pt notes palpitations if walks fast especially on incline - uses rollator\par some brief chest pains at night\par this is stable - no increase in symptoms\par going to optho later - has hole in retina\par has distortion and spots in left eye\par \par 6/3/21\par pt needs eye surgery to repair macular hole\par needs clearance\par pt is feeling well - able to walk a flight of stairs\par ocas chest pain/gas pains - stable -\par has a fib denies - palpitation or shortness of breath\par she is alert\par forms to fill out\par \par 21\par pt saw Dr Cordova cardiologist increased toprol to 100 mg a day\par ordered echo\par on  had repair of macular hole\par  fell at 2 am going to bathroom - 8 am went to ER - casted and then reset last week\par radius fracutre\par vision is slowly improving\par staying on assisted living side\par not exerting self - trying to stay independent\par no chest pain\par \par 21\par cast is off left arm and it is healing up\par went to eye   - it is slightly better - not completely\par heart - has been stable - no chest pain nor palpitations\par needs flu vaccines\par needs meds renewed\par \par 11/3/21\par pt is now back to her usual residence\par her left wrist is recovering - still some limited rom\par occa palpitations in bed at night\par using rollator\par \par 21\par 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\par \par 22\par pt saw cardiologist for check up\par all ok\par labs pending - only cbc back\par c/o rt shoulder pain since yesterday\par left calf pain and swelling\par declines PT/heat / tylenol\par \par left eye better since surgery\par still diminished vision but better\par \par left wrist recovered\par \par 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

## 2022-06-30 NOTE — PHYSICAL EXAM
[General Appearance - Alert] : alert [General Appearance - In No Acute Distress] : in no acute distress [General Appearance - Well Nourished] : well nourished [General Appearance - Well Developed] : well developed [General Appearance - Well-Appearing] : healthy appearing [Sclera] : the sclera and conjunctiva were normal [PERRL With Normal Accommodation] : pupils were equal in size, round, and reactive to light [Extraocular Movements] : extraocular movements were intact [No Oral Cyanosis] : no oral cyanosis [Outer Ear] : the ears and nose were normal in appearance [Hearing Threshold Finger Rub Not [place]] : hearing was normal [Examination Of The Oral Cavity] : the lips and gums were normal [Neck Appearance] : the appearance of the neck was normal [] : no respiratory distress [Exaggerated Use Of Accessory Muscles For Inspiration] : no accessory muscle use [Heart Sounds] : normal S1 and S2 [Heart Sounds Gallop] : no gallops [Heart Sounds Pericardial Friction Rub] : no pericardial rub [Abdomen Soft] : soft [Abdomen Tenderness] : non-tender [Abdomen Mass (___ Cm)] : no abdominal mass palpated [Cervical Lymph Nodes Enlarged Posterior Bilaterally] : posterior cervical [Cervical Lymph Nodes Enlarged Anterior Bilaterally] : anterior cervical [No CVA Tenderness] : no ~M costovertebral angle tenderness [No Spinal Tenderness] : no spinal tenderness [Abnormal Walk] : normal gait [Nail Clubbing] : no clubbing  or cyanosis of the fingernails [Involuntary Movements] : no involuntary movements were seen [Musculoskeletal - Swelling] : no joint swelling seen [Motor Tone] : muscle strength and tone were normal [Skin Color & Pigmentation] : normal skin color and pigmentation [No Focal Deficits] : no focal deficits [Oriented To Time, Place, And Person] : oriented to person, place, and time [Impaired Insight] : insight and judgment were intact [Affect] : the affect was normal [Alert] : alert [Well Nourished] : well nourished [Healthy Appearing] : healthy appearing [No Acute Distress] : in no acute distress [Well Developed] : well developed [Normal Voice/Communication] : normal voice/communication [Normal] : the ears and nose were normal in appearance [Normal Oral Mucosa] : normal oral mucosa [No Oral Pallor] : no oral pallor [Normal Outer Ear/Nose] : the ears and nose were normal in appearance [Normal Appearance] : the appearance of the neck was normal [No Neck Mass] : no neck mass was observed [No Respiratory Distress] : no respiratory distress [Respiration, Rhythm And Depth] : normal respiratory rhythm and effort [No Acc Muscle Use] : no accessory muscle use [Auscultation Breath Sounds / Voice Sounds] : lungs were clear to auscultation bilaterally [de-identified] : walking independently with walker [de-identified] : bilateral ankle edema _+1 varicose veins [FreeTextEntry1] : oa hands,

## 2022-07-12 ENCOUNTER — NON-APPOINTMENT (OUTPATIENT)
Age: 87
End: 2022-07-12

## 2022-07-13 ENCOUNTER — APPOINTMENT (OUTPATIENT)
Dept: CARDIOLOGY | Facility: CLINIC | Age: 87
End: 2022-07-13

## 2022-07-13 ENCOUNTER — NON-APPOINTMENT (OUTPATIENT)
Age: 87
End: 2022-07-13

## 2022-07-13 VITALS
SYSTOLIC BLOOD PRESSURE: 136 MMHG | HEART RATE: 90 BPM | BODY MASS INDEX: 23.82 KG/M2 | HEIGHT: 65 IN | OXYGEN SATURATION: 97 % | TEMPERATURE: 98 F | DIASTOLIC BLOOD PRESSURE: 74 MMHG | WEIGHT: 143 LBS

## 2022-07-13 PROCEDURE — 99072 ADDL SUPL MATRL&STAF TM PHE: CPT

## 2022-07-13 PROCEDURE — 99214 OFFICE O/P EST MOD 30 MIN: CPT

## 2022-07-13 PROCEDURE — 36415 COLL VENOUS BLD VENIPUNCTURE: CPT

## 2022-07-13 PROCEDURE — 93000 ELECTROCARDIOGRAM COMPLETE: CPT

## 2022-07-13 NOTE — HISTORY OF PRESENT ILLNESS
[FreeTextEntry1] : Ms Layton was referred by Dr Ramos, preop- she had   eye surgery on 7/6/2021.\par She was seen here last in 2017, followed for atrial fibrillation.\par \par There have been no hospitalizations since last visit.\par \par \par \par She denies dyspnea at rest or syncope.\par She has had palpitations at night 'my heart is pounding away" every night.\par She has had chest pressure with exertion, none recently but does have LASSITER if she does too much ( which she usually doesn't)\par \par

## 2022-07-13 NOTE — CARDIOLOGY SUMMARY
[de-identified] : 7/13/22-  -atrial fibrillation, possible old asmi [de-identified] : nuclear 2016- normal, ef 72% [de-identified] : 6/9/2021-severely dilated RA/LA, lbh,mild mr severe TR. moderate [place] [de-identified] : chest ct 2018- coronary calcifications aorta 4.3 cm

## 2022-07-14 LAB
ALBUMIN SERPL ELPH-MCNC: 4.5 G/DL
ALP BLD-CCNC: 88 U/L
ALT SERPL-CCNC: 19 U/L
ANION GAP SERPL CALC-SCNC: 16 MMOL/L
AST SERPL-CCNC: 33 U/L
BASOPHILS # BLD AUTO: 0.03 K/UL
BASOPHILS NFR BLD AUTO: 0.5 %
BILIRUB SERPL-MCNC: 0.6 MG/DL
BUN SERPL-MCNC: 13 MG/DL
CALCIUM SERPL-MCNC: 10.4 MG/DL
CHLORIDE SERPL-SCNC: 92 MMOL/L
CHOLEST SERPL-MCNC: 148 MG/DL
CO2 SERPL-SCNC: 26 MMOL/L
CREAT SERPL-MCNC: 0.81 MG/DL
EGFR: 68 ML/MIN/1.73M2
EOSINOPHIL # BLD AUTO: 0.21 K/UL
EOSINOPHIL NFR BLD AUTO: 3.8 %
GLUCOSE SERPL-MCNC: 90 MG/DL
HCT VFR BLD CALC: 36.6 %
HDLC SERPL-MCNC: 60 MG/DL
HGB BLD-MCNC: 11.3 G/DL
IMM GRANULOCYTES NFR BLD AUTO: 0.2 %
LDLC SERPL CALC-MCNC: 65 MG/DL
LYMPHOCYTES # BLD AUTO: 1.39 K/UL
LYMPHOCYTES NFR BLD AUTO: 24.9 %
MAN DIFF?: NORMAL
MCHC RBC-ENTMCNC: 26.8 PG
MCHC RBC-ENTMCNC: 30.9 GM/DL
MCV RBC AUTO: 86.9 FL
MONOCYTES # BLD AUTO: 0.59 K/UL
MONOCYTES NFR BLD AUTO: 10.6 %
NEUTROPHILS # BLD AUTO: 3.35 K/UL
NEUTROPHILS NFR BLD AUTO: 60 %
NONHDLC SERPL-MCNC: 88 MG/DL
PLATELET # BLD AUTO: 182 K/UL
POTASSIUM SERPL-SCNC: 4.2 MMOL/L
PROT SERPL-MCNC: 7.5 G/DL
RBC # BLD: 4.21 M/UL
RBC # FLD: 15.5 %
SODIUM SERPL-SCNC: 134 MMOL/L
TRIGL SERPL-MCNC: 116 MG/DL
TSH SERPL-ACNC: 2.59 UIU/ML
WBC # FLD AUTO: 5.58 K/UL

## 2022-08-02 ENCOUNTER — RX RENEWAL (OUTPATIENT)
Age: 87
End: 2022-08-02

## 2022-08-11 ENCOUNTER — APPOINTMENT (OUTPATIENT)
Dept: GERIATRICS | Facility: HOME HEALTH | Age: 87
End: 2022-08-11

## 2022-08-18 ENCOUNTER — APPOINTMENT (OUTPATIENT)
Dept: GERIATRICS | Facility: HOME HEALTH | Age: 87
End: 2022-08-18

## 2022-08-18 VITALS
HEART RATE: 90 BPM | OXYGEN SATURATION: 100 % | DIASTOLIC BLOOD PRESSURE: 80 MMHG | WEIGHT: 143 LBS | BODY MASS INDEX: 23.8 KG/M2 | SYSTOLIC BLOOD PRESSURE: 140 MMHG | TEMPERATURE: 95 F

## 2022-08-18 PROCEDURE — 99348 HOME/RES VST EST LOW MDM 30: CPT

## 2022-08-18 NOTE — PHYSICAL EXAM
[Alert] : alert [Well Nourished] : well nourished [Healthy Appearing] : healthy appearing [Well Developed] : well developed [Normal Voice/Communication] : normal voice/communication [Normal] : the ears and nose were normal in appearance [Normal Outer Ear/Nose] : the ears and nose were normal in appearance [Normal Appearance] : the appearance of the neck was normal [No Neck Mass] : no neck mass was observed [No Respiratory Distress] : no respiratory distress [No Acc Muscle Use] : no accessory muscle use [General Appearance - Alert] : alert [General Appearance - In No Acute Distress] : in no acute distress [General Appearance - Well Nourished] : well nourished [General Appearance - Well Developed] : well developed [General Appearance - Well-Appearing] : healthy appearing [Sclera] : the sclera and conjunctiva were normal [PERRL With Normal Accommodation] : pupils were equal in size, round, and reactive to light [Extraocular Movements] : extraocular movements were intact [Normal Oral Mucosa] : normal oral mucosa [No Oral Pallor] : no oral pallor [No Oral Cyanosis] : no oral cyanosis [Outer Ear] : the ears and nose were normal in appearance [Hearing Threshold Finger Rub Not [place]] : hearing was normal [Examination Of The Oral Cavity] : the lips and gums were normal [Neck Appearance] : the appearance of the neck was normal [] : no respiratory distress [Respiration, Rhythm And Depth] : normal respiratory rhythm and effort [Exaggerated Use Of Accessory Muscles For Inspiration] : no accessory muscle use [Auscultation Breath Sounds / Voice Sounds] : lungs were clear to auscultation bilaterally [Heart Sounds] : normal S1 and S2 [Heart Sounds Gallop] : no gallops [Heart Sounds Pericardial Friction Rub] : no pericardial rub [Abdomen Soft] : soft [Abdomen Tenderness] : non-tender [Abdomen Mass (___ Cm)] : no abdominal mass palpated [Cervical Lymph Nodes Enlarged Posterior Bilaterally] : posterior cervical [Cervical Lymph Nodes Enlarged Anterior Bilaterally] : anterior cervical [No CVA Tenderness] : no ~M costovertebral angle tenderness [No Spinal Tenderness] : no spinal tenderness [Abnormal Walk] : normal gait [Nail Clubbing] : no clubbing  or cyanosis of the fingernails [Involuntary Movements] : no involuntary movements were seen [Musculoskeletal - Swelling] : no joint swelling seen [Motor Tone] : muscle strength and tone were normal [Skin Color & Pigmentation] : normal skin color and pigmentation [No Focal Deficits] : no focal deficits [Oriented To Time, Place, And Person] : oriented to person, place, and time [Impaired Insight] : insight and judgment were intact [Affect] : the affect was normal [de-identified] : walking independently with walker [de-identified] : bilateral ankle edema _+1 varicose veins [FreeTextEntry1] : oa hands,

## 2022-08-18 NOTE — HISTORY OF PRESENT ILLNESS
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\par afib, htn\par varicose veins\par c/o decreased hearing\par \par 19\par f/up\par had labs 3/2019 wnl\par \par 19\par pt is doing well\par c/o occas jabbing pains in rt thigh\par has varicose veins\par needs all med renewed\par \par 19\par occas jabs rt thigh\par varicose veins\par not takin toprol xl 25 - ran out\par palpitations if sh walks too fast\par needs flu v\par supplied health care proxy - discussed advance directives\par \par 11/15/19\par pt c/o pinching pains in legs - shania when on his feet\par does not want pain meds\par has some shortness of breath on walking\par does wear supp hose\par \par 1/10/20\par pt continues to have occas pains in thighs  as above\par did not go to vascular - not that troublesome\par wears supp hose\par has varicose veins\par has afib - no palpitations\par no chest pain, no sob\par \par 20\par pt has been doing well\par discussed advanced directives\par pt decided on home DNR\par no chest pain\par no sob\par no palps\par \par 20\par pt is now dnr and wears a bracelet\par she is feeling well\par and is independent\par occas papitations\par sticking feeling in rt thigh unchanged\par reviewed meds\par \par 20\par pt is well\par rare palpitatons\par occas sticking feeling in thigh\par varicose veins\par reviewed meds and purpose of each\par \par 20\par pt relays one evening she had palpitaitons - did not report\par otherwise well\par less appetite in summer\par edema foot\par diminished hearing\par \par 9/3/20\par no more episodes of palpitations\par no edema\par going to dentist today - needs a filling\par needs flu vaccine today\par \par 10/29/30\par pt reports she went to eye  and was diagnosed with a retinal hole.\par she is being treated with eye drops\par she manages her own meds\par denies chest pain, palps and sob\par has been at  today and was leading procession "running around"\par \par 20\par she and sister are praying re retinal hole\par has vision loss\par LE edema at the end of the day\par occas chest pain on exertion\par \par 21\par sometimes palpitations at night or when walks quickly\par she did walk with me walker up ramp and was quick and smooth\par had walker adjusted to be higher to help with posture\par had labs 21 - wnl\par \par 21\par \par pt notes palpitations if walks fast especially on incline - uses rollator\par some brief chest pains at night\par this is stable - no increase in symptoms\par going to optho later - has hole in retina\par has distortion and spots in left eye\par \par 6/3/21\par pt needs eye surgery to repair macular hole\par needs clearance\par pt is feeling well - able to walk a flight of stairs\par ocas chest pain/gas pains - stable -\par has a fib denies - palpitation or shortness of breath\par she is alert\par forms to fill out\par \par 21\par pt saw Dr Cordova cardiologist increased toprol to 100 mg a day\par ordered echo\par on  had repair of macular hole\par  fell at 2 am going to bathroom - 8 am went to ER - casted and then reset last week\par radius fracutre\par vision is slowly improving\par staying on assisted living side\par not exerting self - trying to stay independent\par no chest pain\par \par 21\par cast is off left arm and it is healing up\par went to eye   - it is slightly better - not completely\par heart - has been stable - no chest pain nor palpitations\par needs flu vaccines\par needs meds renewed\par \par 11/3/21\par pt is now back to her usual residence\par her left wrist is recovering - still some limited rom\par occa palpitations in bed at night\par using rollator\par \par 21\par 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\par \par 22\par pt saw cardiologist for check up\par all ok\par labs pending - only cbc back\par c/o rt shoulder pain since yesterday\par left calf pain and swelling\par declines PT/heat / tylenol\par \par left eye better since surgery\par still diminished vision but better\par \par left wrist recovered\par \par 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[employer]\par \par 3/17/22\par wearing brace on left wrist\par palpitations on exertion and when lies down at night\par \par 22\par pt seen at [place]\par heart "pounds at times" "does the rumba"\par no change in heart symptoms\par self resolves\par left wrist still hurts at times\par occas jabbing pain in thigh\par to see Dr Cordova in July\par poor hearing\par trace edema - wears supp hose\par \par 22\par c/op heart doing the lena at night\par back pains\par pain left wrist\par had some rt sided abdominal pain which has resolved\par LE edema\par saw cardiologist - Dr Cordova - to get an echo on \par \par

## 2022-08-18 NOTE — ASSESSMENT
[FreeTextEntry1] : pt with htn, afib\par to get hearing checked\par doing well\par \par 5/31/19\par no changes made\par \par 7/26/19\par no changes made\par \par 9/27/19\par reviewed proxy\par flu vaccine given\par restat extra 25 mg toprol\par hr \par \par 11/151/9\par pt seems to have pain  in varicose veins - irregular patterns\par suggested vascular consult\par a fib\par mild sob on exertion\par wearing supp hose\par \par 1/10/20\par decided against vascular consult\par a fib controlled\par BP controlled\par \par 2/28/20\par signed home DNR\par renewed all meds\par \par 4/23/20\par pt is doing well\par no changes made\par \par 5/20/20\par resigned DNR\par \par 7/16/20\par pt is doing well\par cardiac stable\par cleaned ears\par doing well\par no changes in meds\par \par 9/3/20\par doing well\par flu vaccine given\par to go to dentist\par check records re if any blood tests are needed\par \par 10/29/20\par signed DNR\par optho re retinal hole\par check need for labs\par watch BP - slightly high\par heart rate well controlled\par \par 12/29/20\par pt is doing well\par ordered [facility]\par cont meds\par \par 2.25.21\par renewed all meds\par labs good\par angina stable\par to see optho\par renewed DNR\par \par 4/14/21\par pt did not take toprol this am - got busy\par heart rate and BP slightly elevated\par mild stable angina\par did DNR\par to go to optho - re retinal tear\par \par 6/3/21\par pre-op clearance\par labs ordered\par EKG a fib with q wave v1-3\par compared with old ekg - 5/16/17 - q waves are new\par echo ordered\par suggested fup cardiologist\par \par vital signs are stable\par angina stable\par \par no contraindication to planned repair of macular hole\par on 7/6/21\par low risk procdeure\par \par ok to hold eliquis as per optho\par \par 7/22/21\par macular hole repaired \par now with broken radius - in cast\par BP good\par recovering well\par echo mild AS, dilated LA\par mild pulmonary htn\par on increased toprol to 100 - with good results\par \par 9/30/21\par broken arm recovering - cast off\par BP good\par HR good\par flu vaccine given\par meds renewed\par \par 11/3/21\par pt is well\par BP and hr controlled\par cont same meds\par \par 12/16/21\par pt has made good recovery\par afib and hypertension under control\par no changes in meds made\par \par 1/27/22\par pt has a fib and htn well controlled\par has OA - shoulder\par left calf swelling - is on eliquis\par so doubt dvt\par she is mobile\par \par 3/17/22\par pt is well \par renewed lipitor\par renewed home dnr\par labs in Jan 22 - wnl\par saw cardiologist\par no changes made\par \par 5/19/22\par signed home DNR\par pt agrees\par a fib- under control\par bp controlled\par \par 8/18/22\par pt has been stable\par to get an echo at the end of the month\par no change made to meds

## 2022-08-18 NOTE — REVIEW OF SYSTEMS
[Eyesight Problems] : eyesight problems [Arthralgias] : arthralgias [Joint Stiffness] : joint stiffness [Negative] : Heme/Lymph [Palpitations] : no palpitations [FreeTextEntry5] : palpitations on exertion and at night [FreeTextEntry7] : had rt sided abd pain - which has resolved [FreeTextEntry9] : left wrist

## 2022-08-31 ENCOUNTER — APPOINTMENT (OUTPATIENT)
Dept: CARDIOLOGY | Facility: CLINIC | Age: 87
End: 2022-08-31

## 2022-08-31 PROCEDURE — 99072 ADDL SUPL MATRL&STAF TM PHE: CPT

## 2022-08-31 PROCEDURE — 93306 TTE W/DOPPLER COMPLETE: CPT

## 2022-09-29 ENCOUNTER — RX RENEWAL (OUTPATIENT)
Age: 87
End: 2022-09-29

## 2022-10-20 ENCOUNTER — APPOINTMENT (OUTPATIENT)
Dept: GERIATRICS | Facility: HOME HEALTH | Age: 87
End: 2022-10-20

## 2022-10-20 VITALS
WEIGHT: 143.4 LBS | BODY MASS INDEX: 23.86 KG/M2 | DIASTOLIC BLOOD PRESSURE: 70 MMHG | OXYGEN SATURATION: 100 % | SYSTOLIC BLOOD PRESSURE: 140 MMHG | HEART RATE: 82 BPM

## 2022-10-20 PROCEDURE — 99349 HOME/RES VST EST MOD MDM 40: CPT

## 2022-10-20 NOTE — PHYSICAL EXAM
[Alert] : alert [Well Nourished] : well nourished [Healthy Appearing] : healthy appearing [Well Developed] : well developed [Normal Voice/Communication] : normal voice/communication [Normal Outer Ear/Nose] : the ears and nose were normal in appearance [Normal Appearance] : the appearance of the neck was normal [No Neck Mass] : no neck mass was observed [No Respiratory Distress] : no respiratory distress [No Acc Muscle Use] : no accessory muscle use [General Appearance - Alert] : alert [General Appearance - In No Acute Distress] : in no acute distress [General Appearance - Well Nourished] : well nourished [General Appearance - Well Developed] : well developed [General Appearance - Well-Appearing] : healthy appearing [Sclera] : the sclera and conjunctiva were normal [PERRL With Normal Accommodation] : pupils were equal in size, round, and reactive to light [Extraocular Movements] : extraocular movements were intact [Normal Oral Mucosa] : normal oral mucosa [No Oral Pallor] : no oral pallor [No Oral Cyanosis] : no oral cyanosis [Outer Ear] : the ears and nose were normal in appearance [Hearing Threshold Finger Rub Not [place]] : hearing was normal [Examination Of The Oral Cavity] : the lips and gums were normal [Neck Appearance] : the appearance of the neck was normal [] : no respiratory distress [Respiration, Rhythm And Depth] : normal respiratory rhythm and effort [Exaggerated Use Of Accessory Muscles For Inspiration] : no accessory muscle use [Auscultation Breath Sounds / Voice Sounds] : lungs were clear to auscultation bilaterally [Heart Sounds] : normal S1 and S2 [Heart Sounds Gallop] : no gallops [Heart Sounds Pericardial Friction Rub] : no pericardial rub [Abdomen Soft] : soft [Abdomen Tenderness] : non-tender [Abdomen Mass (___ Cm)] : no abdominal mass palpated [Cervical Lymph Nodes Enlarged Posterior Bilaterally] : posterior cervical [Cervical Lymph Nodes Enlarged Anterior Bilaterally] : anterior cervical [No CVA Tenderness] : no ~M costovertebral angle tenderness [No Spinal Tenderness] : no spinal tenderness [Abnormal Walk] : normal gait [Nail Clubbing] : no clubbing  or cyanosis of the fingernails [Musculoskeletal - Swelling] : no joint swelling seen [Motor Tone] : muscle strength and tone were normal [Skin Color & Pigmentation] : normal skin color and pigmentation [No Focal Deficits] : no focal deficits [Impaired Insight] : insight and judgment were intact [Affect] : the affect was normal [Normal Gait] : normal gait [Involuntary Movements] : no involuntary movements were seen [Normal] : normal skin color and pigmentation [Oriented To Time, Place, And Person] : oriented to person, place, and time [Normal Affect] : the affect was normal [Recent Memory Normal] : recent memory was not impaired [Normal Mood] : the mood was normal [Remote Memory Normal] : remote memory was not impaired [de-identified] : walking independently with walker [de-identified] : bilateral ankle edema _+1 varicose veins, left calf is tender [de-identified] : OA hands [FreeTextEntry1] : oa hands,

## 2022-10-20 NOTE — HISTORY OF PRESENT ILLNESS
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\par afib, htn\par varicose veins\par c/o decreased hearing\par \par 19\par f/up\par had labs 3/2019 wnl\par \par 19\par pt is doing well\par c/o occas jabbing pains in rt thigh\par has varicose veins\par needs all med renewed\par \par 19\par occas jabs rt thigh\par varicose veins\par not takin toprol xl 25 - ran out\par palpitations if sh walks too fast\par needs flu v\par supplied health care proxy - discussed advance directives\par \par 11/15/19\par pt c/o pinching pains in legs - shania when on his feet\par does not want pain meds\par has some shortness of breath on walking\par does wear supp hose\par \par 1/10/20\par pt continues to have occas pains in thighs  as above\par did not go to vascular - not that troublesome\par wears supp hose\par has varicose veins\par has afib - no palpitations\par no chest pain, no sob\par \par 20\par pt has been doing well\par discussed advanced directives\par pt decided on home DNR\par no chest pain\par no sob\par no palps\par \par 20\par pt is now dnr and wears a bracelet\par she is feeling well\par and is independent\par occas papitations\par sticking feeling in rt thigh unchanged\par reviewed meds\par \par 20\par pt is well\par rare palpitatons\par occas sticking feeling in thigh\par varicose veins\par reviewed meds and purpose of each\par \par 20\par pt relays one evening she had palpitaitons - did not report\par otherwise well\par less appetite in summer\par edema foot\par diminished hearing\par \par 9/3/20\par no more episodes of palpitations\par no edema\par going to dentist today - needs a filling\par needs flu vaccine today\par \par 10/29/30\par pt reports she went to eye  and was diagnosed with a retinal hole.\par she is being treated with eye drops\par she manages her own meds\par denies chest pain, palps and sob\par has been at  today and was leading procession "running around"\par \par 20\par she and sister are praying re retinal hole\par has vision loss\par LE edema at the end of the day\par occas chest pain on exertion\par \par 21\par sometimes palpitations at night or when walks quickly\par she did walk with me walker up ramp and was quick and smooth\par had walker adjusted to be higher to help with posture\par had labs 21 - wnl\par \par 21\par \par pt notes palpitations if walks fast especially on incline - uses rollator\par some brief chest pains at night\par this is stable - no increase in symptoms\par going to optho later - has hole in retina\par has distortion and spots in left eye\par \par 6/3/21\par pt needs eye surgery to repair macular hole\par needs clearance\par pt is feeling well - able to walk a flight of stairs\par ocas chest pain/gas pains - stable -\par has a fib denies - palpitation or shortness of breath\par she is alert\par forms to fill out\par \par 21\par pt saw Dr Cordova cardiologist increased toprol to 100 mg a day\par ordered echo\par on  had repair of macular hole\par  fell at 2 am going to bathroom - 8 am went to ER - casted and then reset last week\par radius fracutre\par vision is slowly improving\par staying on assisted living side\par not exerting self - trying to stay independent\par no chest pain\par \par 21\par cast is off left arm and it is healing up\par went to eye   - it is slightly better - not completely\par heart - has been stable - no chest pain nor palpitations\par needs flu vaccines\par needs meds renewed\par \par 11/3/21\par pt is now back to her usual residence\par her left wrist is recovering - still some limited rom\par occa palpitations in bed at night\par using rollator\par \par 21\par 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\par \par 22\par pt saw cardiologist for check up\par all ok\par labs pending - only cbc back\par c/o rt shoulder pain since yesterday\par left calf pain and swelling\par declines PT/heat / tylenol\par \par left eye better since surgery\par still diminished vision but better\par \par left wrist recovered\par \par 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[employer]\par \par 3/17/22\par wearing brace on left wrist\par palpitations on exertion and when lies down at night\par \par 22\par pt seen at [facility]\par heart "pounds at times" "does the rumba"\par no change in heart symptoms\par self resolves\par left wrist still hurts at times\par occas jabbing pain in thigh\par to see Dr Cordova in July\par poor hearing\par trace edema - wears supp hose\par \par 22\par c/op heart doing the lena at night\par back pains\par pain left wrist\par had some rt sided abdominal pain which has resolved\par LE edema\par saw cardiologist - Dr Cordova - to get an echo on \par \par 10/20/22\par pt is doing well\par has had flu and   covid booster on 22\par is working here\par walks with walker\par

## 2022-10-20 NOTE — ASSESSMENT
[FreeTextEntry1] : pt is doing well\par a fib under control\par flu and covid booster given\par cont current meds\par referred to vascular

## 2022-12-16 ENCOUNTER — APPOINTMENT (OUTPATIENT)
Dept: VASCULAR SURGERY | Facility: CLINIC | Age: 87
End: 2022-12-16

## 2022-12-16 VITALS — DIASTOLIC BLOOD PRESSURE: 80 MMHG | HEART RATE: 84 BPM | SYSTOLIC BLOOD PRESSURE: 140 MMHG | HEIGHT: 65 IN

## 2022-12-16 PROCEDURE — 99203 OFFICE O/P NEW LOW 30 MIN: CPT

## 2022-12-16 PROCEDURE — 99072 ADDL SUPL MATRL&STAF TM PHE: CPT

## 2022-12-16 RX ORDER — AMMONIUM LACTATE 12 %
12 CREAM (GRAM) TOPICAL TWICE DAILY
Qty: 1 | Refills: 5 | Status: ACTIVE | COMMUNITY
Start: 2022-12-16 | End: 1900-01-01

## 2022-12-16 NOTE — PHYSICAL EXAM
[Normal Breath Sounds] : Normal breath sounds [Normal Rate and Rhythm] : normal rate and rhythm [2+] : right 2+ [0] : left 0 [Ankle Swelling (On Exam)] : present [Ankle Swelling Bilaterally] : bilaterally  [Ankle Swelling On The Right] : mild [Varicose Veins Of Lower Extremities] : present [Varicose Veins Of The Right Leg] : of the right leg [] : bilaterally [Ankle Swelling On The Left] : moderate [Alert] : alert [Oriented to Person] : oriented to person [Oriented to Place] : oriented to place [Oriented to Time] : oriented to time [JVD] : no jugular venous distention  [de-identified] : Awake and Alert [de-identified] : Dry flakey skin bilaterally with stasis changes [de-identified] : Appropriate

## 2022-12-16 NOTE — ASSESSMENT
[FreeTextEntry1] : 89 yo female with bilateral lower extremity edema. Her symptoms are consistent with venous insufficiency. We discussed undergoing an ultrasound to confirm the presence of  venous insufficiency. The patient does not feel it is necessary at this time. She was encouraged to wear mild knee compression stockings daily. She was given a prescription for Ammonium Lactate and instructed to apply the lotion to her lower legs daily. She will follow up if she would like to proceed with an us or her symptoms worsen.

## 2022-12-16 NOTE — HISTORY OF PRESENT ILLNESS
[FreeTextEntry1] : 89 yo female presents to the office with a chief complaint of bilateral lower extremity edema. She reports that she develops worsening swelling through out the day which resolves overnight. She reports a long history of varicose veins. She denies a history of DVT. She has trouble putting on compression stockings.

## 2022-12-16 NOTE — REVIEW OF SYSTEMS
[Lower Ext Edema] : lower extremity edema [Limb Swelling] : limb swelling [Fever] : no fever [Chills] : no chills [Leg Claudication] : no intermittent leg claudication [Limb Pain] : no limb pain [de-identified] : dry skin

## 2023-01-05 ENCOUNTER — APPOINTMENT (OUTPATIENT)
Dept: GERIATRICS | Facility: HOME HEALTH | Age: 88
End: 2023-01-05
Payer: MEDICARE

## 2023-01-05 VITALS
BODY MASS INDEX: 24.06 KG/M2 | SYSTOLIC BLOOD PRESSURE: 135 MMHG | HEART RATE: 95 BPM | WEIGHT: 144.6 LBS | OXYGEN SATURATION: 100 % | DIASTOLIC BLOOD PRESSURE: 70 MMHG

## 2023-01-05 PROCEDURE — 99349 HOME/RES VST EST MOD MDM 40: CPT

## 2023-01-05 NOTE — ASSESSMENT
[FreeTextEntry1] : pt with htn, afib\par to get hearing checked\par doing well\par \par 5/31/19\par no changes made\par \par 7/26/19\par no changes made\par \par 9/27/19\par reviewed proxy\par flu vaccine given\par restat extra 25 mg toprol\par hr \par \par 11/151/9\par pt seems to have pain  in varicose veins - irregular patterns\par suggested vascular consult\par a fib\par mild sob on exertion\par wearing supp hose\par \par 1/10/20\par decided against vascular consult\par a fib controlled\par BP controlled\par \par 2/28/20\par signed home DNR\par renewed all meds\par \par 4/23/20\par pt is doing well\par no changes made\par \par 5/20/20\par resigned DNR\par \par 7/16/20\par pt is doing well\par cardiac stable\par cleaned ears\par doing well\par no changes in meds\par \par 9/3/20\par doing well\par flu vaccine given\par to go to dentist\par check records re if any blood tests are needed\par \par 10/29/20\par signed DNR\par optho re retinal hole\par check need for labs\par watch BP - slightly high\par heart rate well controlled\par \par 12/29/20\par pt is doing well\par ordered [facility]\par cont meds\par \par 2.25.21\par renewed all meds\par labs good\par angina stable\par to see optho\par renewed DNR\par \par 4/14/21\par pt did not take toprol this am - got busy\par heart rate and BP slightly elevated\par mild stable angina\par did DNR\par to go to optho - re retinal tear\par \par 6/3/21\par pre-op clearance\par labs ordered\par EKG a fib with q wave v1-3\par compared with old ekg - 5/16/17 - q waves are new\par echo ordered\par suggested fup cardiologist\par \par vital signs are stable\par angina stable\par \par no contraindication to planned repair of macular hole\par on 7/6/21\par low risk procdeure\par \par ok to hold eliquis as per optho\par \par 7/22/21\par macular hole repaired \par now with broken radius - in cast\par BP good\par recovering well\par echo mild AS, dilated LA\par mild pulmonary htn\par on increased toprol to 100 - with good results\par \par 9/30/21\par broken arm recovering - cast off\par BP good\par HR good\par flu vaccine given\par meds renewed\par \par 11/3/21\par pt is well\par BP and hr controlled\par cont same meds\par \par 12/16/21\par pt has made good recovery\par afib and hypertension under control\par no changes in meds made\par \par 1/27/22\par pt has a fib and htn well controlled\par has OA - shoulder\par left calf swelling - is on eliquis\par so doubt dvt\par she is mobile\par \par 3/17/22\par pt is well \par renewed lipitor\par renewed home dnr\par labs in Jan 22 - wnl\par saw cardiologist\par no changes made\par \par 5/19/22\par signed home DNR\par pt agrees\par a fib- under control\par bp controlled\par \par 8/18/22\par pt has been stable\par to get an echo at the end of the month\par no change made to meds\par \par 1/5/23\par pt is slightly tacchy today\par no sign of chf\par lungs clear\par bp and oxygen good\par weight is stable\par has some venous insuff\par cont same meds\par to see dr aaron later this month

## 2023-01-05 NOTE — PHYSICAL EXAM
[Alert] : alert [Well Nourished] : well nourished [Healthy Appearing] : healthy appearing [Well Developed] : well developed [Normal Voice/Communication] : normal voice/communication [Normal Outer Ear/Nose] : the ears and nose were normal in appearance [Normal Appearance] : the appearance of the neck was normal [No Neck Mass] : no neck mass was observed [No Respiratory Distress] : no respiratory distress [No Acc Muscle Use] : no accessory muscle use [Normal Gait] : normal gait [Normal] : normal skin color and pigmentation [Normal Affect] : the affect was normal [Recent Memory Normal] : recent memory was not impaired [Normal Mood] : the mood was normal [Remote Memory Normal] : remote memory was not impaired [General Appearance - Alert] : alert [General Appearance - In No Acute Distress] : in no acute distress [General Appearance - Well Nourished] : well nourished [General Appearance - Well Developed] : well developed [General Appearance - Well-Appearing] : healthy appearing [Sclera] : the sclera and conjunctiva were normal [PERRL With Normal Accommodation] : pupils were equal in size, round, and reactive to light [Extraocular Movements] : extraocular movements were intact [Normal Oral Mucosa] : normal oral mucosa [No Oral Pallor] : no oral pallor [No Oral Cyanosis] : no oral cyanosis [Outer Ear] : the ears and nose were normal in appearance [Hearing Threshold Finger Rub Not [place]] : hearing was normal [Examination Of The Oral Cavity] : the lips and gums were normal [Neck Appearance] : the appearance of the neck was normal [] : no respiratory distress [Respiration, Rhythm And Depth] : normal respiratory rhythm and effort [Exaggerated Use Of Accessory Muscles For Inspiration] : no accessory muscle use [Auscultation Breath Sounds / Voice Sounds] : lungs were clear to auscultation bilaterally [Heart Sounds] : normal S1 and S2 [Heart Sounds Gallop] : no gallops [Heart Sounds Pericardial Friction Rub] : no pericardial rub [Abdomen Soft] : soft [Abdomen Tenderness] : non-tender [Abdomen Mass (___ Cm)] : no abdominal mass palpated [Cervical Lymph Nodes Enlarged Posterior Bilaterally] : posterior cervical [Cervical Lymph Nodes Enlarged Anterior Bilaterally] : anterior cervical [No CVA Tenderness] : no ~M costovertebral angle tenderness [No Spinal Tenderness] : no spinal tenderness [Abnormal Walk] : normal gait [Nail Clubbing] : no clubbing  or cyanosis of the fingernails [Involuntary Movements] : no involuntary movements were seen [Musculoskeletal - Swelling] : no joint swelling seen [Motor Tone] : muscle strength and tone were normal [Skin Color & Pigmentation] : normal skin color and pigmentation [No Focal Deficits] : no focal deficits [Oriented To Time, Place, And Person] : oriented to person, place, and time [Impaired Insight] : insight and judgment were intact [Affect] : the affect was normal [de-identified] : walking independently with walker [de-identified] : bilateral ankle edema _+1 varicose veins, left calf is tender [de-identified] : OA hands [FreeTextEntry1] : oa hands,

## 2023-01-05 NOTE — HISTORY OF PRESENT ILLNESS
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\par afib, htn\par varicose veins\par c/o decreased hearing\par \par 19\par f/up\par had labs 3/2019 wnl\par \par 19\par pt is doing well\par c/o occas jabbing pains in rt thigh\par has varicose veins\par needs all med renewed\par \par 19\par occas jabs rt thigh\par varicose veins\par not takin toprol xl 25 - ran out\par palpitations if sh walks too fast\par needs flu v\par supplied health care proxy - discussed advance directives\par \par 11/15/19\par pt c/o pinching pains in legs - shania when on his feet\par does not want pain meds\par has some shortness of breath on walking\par does wear supp hose\par \par 1/10/20\par pt continues to have occas pains in thighs  as above\par did not go to vascular - not that troublesome\par wears supp hose\par has varicose veins\par has afib - no palpitations\par no chest pain, no sob\par \par 20\par pt has been doing well\par discussed advanced directives\par pt decided on home DNR\par no chest pain\par no sob\par no palps\par \par 20\par pt is now dnr and wears a bracelet\par she is feeling well\par and is independent\par occas papitations\par sticking feeling in rt thigh unchanged\par reviewed meds\par \par 20\par pt is well\par rare palpitatons\par occas sticking feeling in thigh\par varicose veins\par reviewed meds and purpose of each\par \par 20\par pt relays one evening she had palpitaitons - did not report\par otherwise well\par less appetite in summer\par edema foot\par diminished hearing\par \par 9/3/20\par no more episodes of palpitations\par no edema\par going to dentist today - needs a filling\par needs flu vaccine today\par \par 10/29/30\par pt reports she went to eye  and was diagnosed with a retinal hole.\par she is being treated with eye drops\par she manages her own meds\par denies chest pain, palps and sob\par has been at  today and was leading procession "running around"\par \par 20\par she and sister are praying re retinal hole\par has vision loss\par LE edema at the end of the day\par occas chest pain on exertion\par \par 21\par sometimes palpitations at night or when walks quickly\par she did walk with me walker up ramp and was quick and smooth\par had walker adjusted to be higher to help with posture\par had labs 21 - wnl\par \par 21\par \par pt notes palpitations if walks fast especially on incline - uses rollator\par some brief chest pains at night\par this is stable - no increase in symptoms\par going to optho later - has hole in retina\par has distortion and spots in left eye\par \par 6/3/21\par pt needs eye surgery to repair macular hole\par needs clearance\par pt is feeling well - able to walk a flight of stairs\par ocas chest pain/gas pains - stable -\par has a fib denies - palpitation or shortness of breath\par she is alert\par forms to fill out\par \par 21\par pt saw Dr Cordova cardiologist increased toprol to 100 mg a day\par ordered echo\par on  had repair of macular hole\par  fell at 2 am going to bathroom - 8 am went to ER - casted and then reset last week\par radius fracutre\par vision is slowly improving\par staying on assisted living side\par not exerting self - trying to stay independent\par no chest pain\par \par 21\par cast is off left arm and it is healing up\par went to eye   - it is slightly better - not completely\par heart - has been stable - no chest pain nor palpitations\par needs flu vaccines\par needs meds renewed\par \par 11/3/21\par pt is now back to her usual residence\par her left wrist is recovering - still some limited rom\par occa palpitations in bed at night\par using rollator\par \par 21\par 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\par \par 22\par pt saw cardiologist for check up\par all ok\par labs pending - only cbc back\par c/o rt shoulder pain since yesterday\par left calf pain and swelling\par declines PT/heat / tylenol\par \par left eye better since surgery\par still diminished vision but better\par \par left wrist recovered\par \par 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[employer]\par \par 3/17/22\par wearing brace on left wrist\par palpitations on exertion and when lies down at night\par \par 22\par pt seen at [facility]\par heart "pounds at times" "does the rumba"\par no change in heart symptoms\par self resolves\par left wrist still hurts at times\par occas jabbing pain in thigh\par to see Dr Cordova in July\par poor hearing\par trace edema - wears supp hose\par \par 22\par c/op heart doing the lena at night\par back pains\par pain left wrist\par had some rt sided abdominal pain which has resolved\par LE edema\par saw cardiologist - Dr Cordova - to get an echo on \par \par 10/20/22\par pt is doing well\par has had flu and   covid booster on 22\par is working here\par walks with walker\par \par 23\par pt saw Dr Estrada\par has venous insuf\par given lac hydrin\par And supp hose\par followed by agnieszka for a fib\par few weeks ago chest pain in the night - went away by self\par has appt this month with cardiolgist \par

## 2023-02-09 ENCOUNTER — APPOINTMENT (OUTPATIENT)
Dept: GERIATRICS | Facility: HOME HEALTH | Age: 88
End: 2023-02-09
Payer: MEDICARE

## 2023-02-09 VITALS
OXYGEN SATURATION: 100 % | SYSTOLIC BLOOD PRESSURE: 138 MMHG | WEIGHT: 145.4 LBS | BODY MASS INDEX: 24.2 KG/M2 | HEART RATE: 89 BPM | TEMPERATURE: 96.9 F | DIASTOLIC BLOOD PRESSURE: 80 MMHG

## 2023-02-09 PROCEDURE — 99348 HOME/RES VST EST LOW MDM 30: CPT

## 2023-02-09 NOTE — HISTORY OF PRESENT ILLNESS
15 days supply sent as visit due in May for more.   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\par afib, htn\par varicose veins\par c/o decreased hearing\par \par 19\par f/up\par had labs 3/2019 wnl\par \par 19\par pt is doing well\par c/o occas jabbing pains in rt thigh\par has varicose veins\par needs all med renewed\par \par 19\par occas jabs rt thigh\par varicose veins\par not takin toprol xl 25 - ran out\par palpitations if sh walks too fast\par needs flu v\par supplied health care proxy - discussed advance directives\par \par 11/15/19\par pt c/o pinching pains in legs - shania when on his feet\par does not want pain meds\par has some shortness of breath on walking\par does wear supp hose\par \par 1/10/20\par pt continues to have occas pains in thighs  as above\par did not go to vascular - not that troublesome\par wears supp hose\par has varicose veins\par has afib - no palpitations\par no chest pain, no sob\par \par 20\par pt has been doing well\par discussed advanced directives\par pt decided on home DNR\par no chest pain\par no sob\par no palps\par \par 20\par pt is now dnr and wears a bracelet\par she is feeling well\par and is independent\par occas papitations\par sticking feeling in rt thigh unchanged\par reviewed meds\par \par 20\par pt is well\par rare palpitatons\par occas sticking feeling in thigh\par varicose veins\par reviewed meds and purpose of each\par \par 20\par pt relays one evening she had palpitaitons - did not report\par otherwise well\par less appetite in summer\par edema foot\par diminished hearing\par \par 9/3/20\par no more episodes of palpitations\par no edema\par going to dentist today - needs a filling\par needs flu vaccine today\par \par 10/29/30\par pt reports she went to eye  and was diagnosed with a retinal hole.\par she is being treated with eye drops\par she manages her own meds\par denies chest pain, palps and sob\par has been at  today and was leading procession "running around"\par \par 20\par she and sister are praying re retinal hole\par has vision loss\par LE edema at the end of the day\par occas chest pain on exertion\par \par 21\par sometimes palpitations at night or when walks quickly\par she did walk with me walker up ramp and was quick and smooth\par had walker adjusted to be higher to help with posture\par had labs 21 - wnl\par \par 21\par \par pt notes palpitations if walks fast especially on incline - uses rollator\par some brief chest pains at night\par this is stable - no increase in symptoms\par going to optho later - has hole in retina\par has distortion and spots in left eye\par \par 6/3/21\par pt needs eye surgery to repair macular hole\par needs clearance\par pt is feeling well - able to walk a flight of stairs\par ocas chest pain/gas pains - stable -\par has a fib denies - palpitation or shortness of breath\par she is alert\par forms to fill out\par \par 21\par pt saw Dr Cordova cardiologist increased toprol to 100 mg a day\par ordered echo\par on  had repair of macular hole\par  fell at 2 am going to bathroom - 8 am went to ER - casted and then reset last week\par radius fracutre\par vision is slowly improving\par staying on assisted living side\par not exerting self - trying to stay independent\par no chest pain\par \par 21\par cast is off left arm and it is healing up\par went to eye   - it is slightly better - not completely\par heart - has been stable - no chest pain nor palpitations\par needs flu vaccines\par needs meds renewed\par \par 11/3/21\par pt is now back to her usual residence\par her left wrist is recovering - still some limited rom\par occa palpitations in bed at night\par using rollator\par \par 21\par 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\par \par 22\par pt saw cardiologist for check up\par all ok\par labs pending - only cbc back\par c/o rt shoulder pain since yesterday\par left calf pain and swelling\par declines PT/heat / tylenol\par \par left eye better since surgery\par still diminished vision but better\par \par left wrist recovered\par \par 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[employer]\par \par 3/17/22\par wearing brace on left wrist\par palpitations on exertion and when lies down at night\par \par 22\par pt seen at [facility]\par heart "pounds at times" "does the rumba"\par no change in heart symptoms\par self resolves\par left wrist still hurts at times\par occas jabbing pain in thigh\par to see Dr Cordova in July\par poor hearing\par trace edema - wears supp hose\par \par 22\par c/op heart doing the lena at night\par back pains\par pain left wrist\par had some rt sided abdominal pain which has resolved\par LE edema\par saw cardiologist - Dr Cordova - to get an echo on \par \par 10/20/22\par pt is doing well\par has had flu and   covid booster on 22\par is working here\par walks with walker\par \par 23\par pt saw Dr Estrada\par has venous insuf\par given lac hydrin\par And supp hose\par followed by agnieszka for a fib\par few weeks ago chest pain in the night - went away by self\par has appt this month with cardiolgist \par \par \par 23\par pt is wearing supp hose as per Dr Estrada vascular\par hac hydrin\par some mild errythema = left leg\par supp hose make her feel more comfortable\par Dr Cordova - christopher iin May

## 2023-02-09 NOTE — PHYSICAL EXAM
[Alert] : alert [Well Nourished] : well nourished [Healthy Appearing] : healthy appearing [Well Developed] : well developed [Normal Voice/Communication] : normal voice/communication [Normal Outer Ear/Nose] : the ears and nose were normal in appearance [Normal Appearance] : the appearance of the neck was normal [No Neck Mass] : no neck mass was observed [No Respiratory Distress] : no respiratory distress [No Acc Muscle Use] : no accessory muscle use [Normal Gait] : normal gait [Normal] : normal skin color and pigmentation [Normal Affect] : the affect was normal [Recent Memory Normal] : recent memory was not impaired [Normal Mood] : the mood was normal [Remote Memory Normal] : remote memory was not impaired [General Appearance - Alert] : alert [General Appearance - In No Acute Distress] : in no acute distress [General Appearance - Well Nourished] : well nourished [General Appearance - Well Developed] : well developed [General Appearance - Well-Appearing] : healthy appearing [Sclera] : the sclera and conjunctiva were normal [PERRL With Normal Accommodation] : pupils were equal in size, round, and reactive to light [Extraocular Movements] : extraocular movements were intact [Normal Oral Mucosa] : normal oral mucosa [No Oral Pallor] : no oral pallor [No Oral Cyanosis] : no oral cyanosis [Outer Ear] : the ears and nose were normal in appearance [Hearing Threshold Finger Rub Not [place]] : hearing was normal [Examination Of The Oral Cavity] : the lips and gums were normal [Neck Appearance] : the appearance of the neck was normal [] : no respiratory distress [Respiration, Rhythm And Depth] : normal respiratory rhythm and effort [Exaggerated Use Of Accessory Muscles For Inspiration] : no accessory muscle use [Auscultation Breath Sounds / Voice Sounds] : lungs were clear to auscultation bilaterally [Heart Sounds] : normal S1 and S2 [Heart Sounds Gallop] : no gallops [Heart Sounds Pericardial Friction Rub] : no pericardial rub [Abdomen Soft] : soft [Abdomen Tenderness] : non-tender [Abdomen Mass (___ Cm)] : no abdominal mass palpated [Cervical Lymph Nodes Enlarged Posterior Bilaterally] : posterior cervical [Cervical Lymph Nodes Enlarged Anterior Bilaterally] : anterior cervical [No CVA Tenderness] : no ~M costovertebral angle tenderness [No Spinal Tenderness] : no spinal tenderness [Abnormal Walk] : normal gait [Nail Clubbing] : no clubbing  or cyanosis of the fingernails [Involuntary Movements] : no involuntary movements were seen [Musculoskeletal - Swelling] : no joint swelling seen [Motor Tone] : muscle strength and tone were normal [Skin Color & Pigmentation] : normal skin color and pigmentation [No Focal Deficits] : no focal deficits [Oriented To Time, Place, And Person] : oriented to person, place, and time [Impaired Insight] : insight and judgment were intact [Affect] : the affect was normal [de-identified] : walking independently with walker [de-identified] : bilateral ankle edema _+1 varicose veins, left calf is sl errythematous [de-identified] : OA hands [FreeTextEntry1] : oa hands,

## 2023-02-09 NOTE — REVIEW OF SYSTEMS
[Eyesight Problems] : eyesight problems [Arthralgias] : arthralgias [Joint Stiffness] : joint stiffness [Negative] : Heme/Lymph [Palpitations] : no palpitations [FreeTextEntry5] : palpitations on exertion and at night [FreeTextEntry6] : lower extremity edema [FreeTextEntry7] : had rt sided abd pain - which has resolved [FreeTextEntry9] : left wrist

## 2023-02-09 NOTE — ASSESSMENT
[FreeTextEntry1] : pt with htn, afib\par to get hearing checked\par doing well\par \par 5/31/19\par no changes made\par \par 7/26/19\par no changes made\par \par 9/27/19\par reviewed proxy\par flu vaccine given\par restat extra 25 mg toprol\par hr \par \par 11/151/9\par pt seems to have pain  in varicose veins - irregular patterns\par suggested vascular consult\par a fib\par mild sob on exertion\par wearing supp hose\par \par 1/10/20\par decided against vascular consult\par a fib controlled\par BP controlled\par \par 2/28/20\par signed home DNR\par renewed all meds\par \par 4/23/20\par pt is doing well\par no changes made\par \par 5/20/20\par resigned DNR\par \par 7/16/20\par pt is doing well\par cardiac stable\par cleaned ears\par doing well\par no changes in meds\par \par 9/3/20\par doing well\par flu vaccine given\par to go to dentist\par check records re if any blood tests are needed\par \par 10/29/20\par signed DNR\par optho re retinal hole\par check need for labs\par watch BP - slightly high\par heart rate well controlled\par \par 12/29/20\par pt is doing well\par [facility]\par cont meds\par \par 2.25.21\par renewed all meds\par labs good\par angina stable\par to see optho\par renewed DNR\par \par 4/14/21\par pt did not take toprol this am - got busy\par heart rate and BP slightly elevated\par mild stable angina\par did DNR\par to go to optho - re retinal tear\par \par 6/3/21\par pre-op clearance\par labs ordered\par EKG a fib with q wave v1-3\par compared with old ekg - 5/16/17 - q waves are new\par echo ordered\par suggested fup cardiologist\par \par vital signs are stable\par angina stable\par \par no contraindication to planned repair of macular hole\par on 7/6/21\par low risk procdeure\par \par ok to hold eliquis as per optho\par \par 7/22/21\par macular hole repaired \par now with broken radius - in cast\par BP good\par recovering well\par echo mild AS, dilated LA\par mild pulmonary htn\par on increased toprol to 100 - with good results\par \par 9/30/21\par broken arm recovering - cast off\par BP good\par HR good\par flu vaccine given\par meds renewed\par \par 11/3/21\par pt is well\par BP and hr controlled\par cont same meds\par \par 12/16/21\par pt has made good recovery\par afib and hypertension under control\par no changes in meds made\par \par 1/27/22\par pt has a fib and htn well controlled\par has OA - shoulder\par left calf swelling - is on eliquis\par so doubt dvt\par she is mobile\par \par 3/17/22\par pt is well \par renewed lipitor\par renewed home dnr\par labs in Jan 22 - wnl\par saw cardiologist\par no changes made\par \par 5/19/22\par signed home DNR\par pt agrees\par a fib- under control\par bp controlled\par \par 8/18/22\par pt has been stable\par to get an echo at the end of the month\par no change made to meds\par \par 1/5/23\par pt is slightly tacchy today\par no sign of chf\par lungs clear\par bp and oxygen good\par weight is stable\par has some venous insuff\par cont same meds\par to see dr aaron later this month\par \par 2/9/23\par pt turned 89\par she is alert and independent\par walks with walker\par she is wearing supp hose fro venous insuff\par vitals are all stable

## 2023-02-28 ENCOUNTER — NON-APPOINTMENT (OUTPATIENT)
Age: 88
End: 2023-02-28

## 2023-03-01 ENCOUNTER — APPOINTMENT (OUTPATIENT)
Dept: CARDIOLOGY | Facility: CLINIC | Age: 88
End: 2023-03-01
Payer: MEDICARE

## 2023-03-01 ENCOUNTER — NON-APPOINTMENT (OUTPATIENT)
Age: 88
End: 2023-03-01

## 2023-03-01 VITALS
HEIGHT: 65 IN | WEIGHT: 141.19 LBS | DIASTOLIC BLOOD PRESSURE: 70 MMHG | TEMPERATURE: 97.6 F | SYSTOLIC BLOOD PRESSURE: 132 MMHG | RESPIRATION RATE: 16 BRPM | OXYGEN SATURATION: 98 % | HEART RATE: 90 BPM | BODY MASS INDEX: 23.52 KG/M2

## 2023-03-01 PROCEDURE — 99215 OFFICE O/P EST HI 40 MIN: CPT

## 2023-03-01 PROCEDURE — 99072 ADDL SUPL MATRL&STAF TM PHE: CPT

## 2023-03-01 PROCEDURE — 93000 ELECTROCARDIOGRAM COMPLETE: CPT

## 2023-03-01 NOTE — CARDIOLOGY SUMMARY
[de-identified] : 3/1/23  -atrial fibrillation, possible old asmi [de-identified] : nuclear 2016- normal, ef 72% [de-identified] : 8/31/22-severely dilated RA/LA, lvh,,mod  mr severe TR. moderate ME, mild AI,elevated RAP, dilated RV [de-identified] : chest ct 2018- coronary calcifications aorta 4.3 cm

## 2023-03-01 NOTE — HISTORY OF PRESENT ILLNESS
[FreeTextEntry1] : Ms Layton was referred by Dr Ramos, preop- she had   eye surgery on 7/6/2021.\par She is  followed for atrial fibrillation.\par \par There have been no hospitalizations since last visit.\par She has LASSITER, denies pnd, orthopnea or pedal edema\par She denies chest pain, dyspnea at rest , palpitations or syncope.\par \par \par

## 2023-03-23 ENCOUNTER — APPOINTMENT (OUTPATIENT)
Dept: GERIATRICS | Facility: HOME HEALTH | Age: 88
End: 2023-03-23
Payer: MEDICARE

## 2023-03-23 VITALS
OXYGEN SATURATION: 100 % | DIASTOLIC BLOOD PRESSURE: 65 MMHG | HEART RATE: 87 BPM | SYSTOLIC BLOOD PRESSURE: 130 MMHG | TEMPERATURE: 97 F

## 2023-03-23 PROCEDURE — 99348 HOME/RES VST EST LOW MDM 30: CPT

## 2023-03-23 NOTE — PHYSICAL EXAM
[Alert] : alert [Well Nourished] : well nourished [Healthy Appearing] : healthy appearing [Well Developed] : well developed [Normal Voice/Communication] : normal voice/communication [Normal Outer Ear/Nose] : the ears and nose were normal in appearance [Normal Appearance] : the appearance of the neck was normal [No Neck Mass] : no neck mass was observed [No Respiratory Distress] : no respiratory distress [No Acc Muscle Use] : no accessory muscle use [Normal Gait] : normal gait [Normal] : normal skin color and pigmentation [Normal Affect] : the affect was normal [Recent Memory Normal] : recent memory was not impaired [Normal Mood] : the mood was normal [Remote Memory Normal] : remote memory was not impaired [General Appearance - Alert] : alert [General Appearance - In No Acute Distress] : in no acute distress [General Appearance - Well Nourished] : well nourished [General Appearance - Well Developed] : well developed [General Appearance - Well-Appearing] : healthy appearing [Sclera] : the sclera and conjunctiva were normal [PERRL With Normal Accommodation] : pupils were equal in size, round, and reactive to light [Extraocular Movements] : extraocular movements were intact [Normal Oral Mucosa] : normal oral mucosa [No Oral Pallor] : no oral pallor [No Oral Cyanosis] : no oral cyanosis [Outer Ear] : the ears and nose were normal in appearance [Hearing Threshold Finger Rub Not [place]] : hearing was normal [Examination Of The Oral Cavity] : the lips and gums were normal [Neck Appearance] : the appearance of the neck was normal [] : no respiratory distress [Respiration, Rhythm And Depth] : normal respiratory rhythm and effort [Exaggerated Use Of Accessory Muscles For Inspiration] : no accessory muscle use [Auscultation Breath Sounds / Voice Sounds] : lungs were clear to auscultation bilaterally [Heart Sounds] : normal S1 and S2 [Heart Sounds Gallop] : no gallops [Heart Sounds Pericardial Friction Rub] : no pericardial rub [Abdomen Soft] : soft [Abdomen Tenderness] : non-tender [Abdomen Mass (___ Cm)] : no abdominal mass palpated [Cervical Lymph Nodes Enlarged Posterior Bilaterally] : posterior cervical [Cervical Lymph Nodes Enlarged Anterior Bilaterally] : anterior cervical [No CVA Tenderness] : no ~M costovertebral angle tenderness [No Spinal Tenderness] : no spinal tenderness [Abnormal Walk] : normal gait [Nail Clubbing] : no clubbing  or cyanosis of the fingernails [Involuntary Movements] : no involuntary movements were seen [Musculoskeletal - Swelling] : no joint swelling seen [Motor Tone] : muscle strength and tone were normal [Skin Color & Pigmentation] : normal skin color and pigmentation [No Focal Deficits] : no focal deficits [Oriented To Time, Place, And Person] : oriented to person, place, and time [Impaired Insight] : insight and judgment were intact [Affect] : the affect was normal [de-identified] : walking independently with walker [de-identified] : bilateral ankle edema _+1 varicose veins, left calf is sl errythematous [de-identified] : OA hands [FreeTextEntry1] : oa hands,

## 2023-03-23 NOTE — HISTORY OF PRESENT ILLNESS
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\par afib, htn\par varicose veins\par c/o decreased hearing\par \par 19\par f/up\par had labs 3/2019 wnl\par \par 19\par pt is doing well\par c/o occas jabbing pains in rt thigh\par has varicose veins\par needs all med renewed\par \par 19\par occas jabs rt thigh\par varicose veins\par not takin toprol xl 25 - ran out\par palpitations if sh walks too fast\par needs flu v\par supplied health care proxy - discussed advance directives\par \par 11/15/19\par pt c/o pinching pains in legs - shania when on his feet\par does not want pain meds\par has some shortness of breath on walking\par does wear supp hose\par \par 1/10/20\par pt continues to have occas pains in thighs  as above\par did not go to vascular - not that troublesome\par wears supp hose\par has varicose veins\par has afib - no palpitations\par no chest pain, no sob\par \par 20\par pt has been doing well\par discussed advanced directives\par pt decided on home DNR\par no chest pain\par no sob\par no palps\par \par 20\par pt is now dnr and wears a bracelet\par she is feeling well\par and is independent\par occas papitations\par sticking feeling in rt thigh unchanged\par reviewed meds\par \par 20\par pt is well\par rare palpitatons\par occas sticking feeling in thigh\par varicose veins\par reviewed meds and purpose of each\par \par 20\par pt relays one evening she had palpitaitons - did not report\par otherwise well\par less appetite in summer\par edema foot\par diminished hearing\par \par 9/3/20\par no more episodes of palpitations\par no edema\par going to dentist today - needs a filling\par needs flu vaccine today\par \par 10/29/30\par pt reports she went to eye  and was diagnosed with a retinal hole.\par she is being treated with eye drops\par she manages her own meds\par denies chest pain, palps and sob\par has been at  today and was leading procession "running around"\par \par 20\par she and sister are praying re retinal hole\par has vision loss\par LE edema at the end of the day\par occas chest pain on exertion\par \par 21\par sometimes palpitations at night or when walks quickly\par she did walk with me walker up ramp and was quick and smooth\par had walker adjusted to be higher to help with posture\par had labs 21 - wnl\par \par 21\par \par pt notes palpitations if walks fast especially on incline - uses rollator\par some brief chest pains at night\par this is stable - no increase in symptoms\par going to optho later - has hole in retina\par has distortion and spots in left eye\par \par 6/3/21\par pt needs eye surgery to repair macular hole\par needs clearance\par pt is feeling well - able to walk a flight of stairs\par ocas chest pain/gas pains - stable -\par has a fib denies - palpitation or shortness of breath\par she is alert\par forms to fill out\par \par 21\par pt saw Dr Cordova cardiologist increased toprol to 100 mg a day\par ordered echo\par on  had repair of macular hole\par  fell at 2 am going to bathroom - 8 am went to ER - casted and then reset last week\par radius fracutre\par vision is slowly improving\par staying on assisted living side\par not exerting self - trying to stay independent\par no chest pain\par \par 21\par cast is off left arm and it is healing up\par went to eye   - it is slightly better - not completely\par heart - has been stable - no chest pain nor palpitations\par needs flu vaccines\par needs meds renewed\par \par 11/3/21\par pt is now back to her usual residence\par her left wrist is recovering - still some limited rom\par occa palpitations in bed at night\par using rollator\par \par 21\par 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\par \par 22\par pt saw cardiologist for check up\par all ok\par labs pending - only cbc back\par c/o rt shoulder pain since yesterday\par left calf pain and swelling\par declines PT/heat / tylenol\par \par left eye better since surgery\par still diminished vision but better\par \par left wrist recovered\par \par 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[employer]\par \par 3/17/22\par wearing brace on left wrist\par palpitations on exertion and when lies down at night\par \par 22\par pt seen at [facility]\par heart "pounds at times" "does the rumba"\par no change in heart symptoms\par self resolves\par left wrist still hurts at times\par occas jabbing pain in thigh\par to see Dr Cordova in July\par poor hearing\par trace edema - wears supp hose\par \par 22\par c/op heart doing the lena at night\par back pains\par pain left wrist\par had some rt sided abdominal pain which has resolved\par LE edema\par saw cardiologist - Dr Cordova - to get an echo on \par \par 10/20/22\par pt is doing well\par has had flu and   covid booster on 22\par is working here\par walks with walker\par \par 23\par pt saw Dr Estrada\par has venous insuf\par given lac hydrin\par And supp hose\par followed by agnieszka for a fib\par few weeks ago chest pain in the night - went away by self\par has appt this month with cardiolgist \par \par \par 23\par pt is wearing supp hose as per Dr Estrada vascular\par lac hydrin\par some mild errythema = left leg\par supp hose make her feel more comfortable\par Dr Cordova - cards iin May \par \par 3/23/23\par pt is well\par did see dr cordova earlier this month\par no changes made\par still gets palpitiations at times shania on exertion

## 2023-03-23 NOTE — ASSESSMENT
[FreeTextEntry1] : pt with htn, afib\par to get hearing checked\par doing well\par \par 5/31/19\par no changes made\par \par 7/26/19\par no changes made\par \par 9/27/19\par reviewed proxy\par flu vaccine given\par restat extra 25 mg toprol\par hr \par \par 11/151/9\par pt seems to have pain  in varicose veins - irregular patterns\par suggested vascular consult\par a fib\par mild sob on exertion\par wearing supp hose\par \par 1/10/20\par decided against vascular consult\par a fib controlled\par BP controlled\par \par 2/28/20\par signed home DNR\par renewed all meds\par \par 4/23/20\par pt is doing well\par no changes made\par \par 5/20/20\par resigned DNR\par \par 7/16/20\par pt is doing well\par cardiac stable\par cleaned ears\par doing well\par no changes in meds\par \par 9/3/20\par doing well\par flu vaccine given\par to go to dentist\par check records re if any blood tests are needed\par \par 10/29/20\par signed DNR\par optho re retinal hole\par check need for labs\par watch BP - slightly high\par heart rate well controlled\par \par 12/29/20\par pt is doing well\par [facility]\par cont meds\par \par 2.25.21\par renewed all meds\par labs good\par angina stable\par to see optho\par renewed DNR\par \par 4/14/21\par pt did not take toprol this am - got busy\par heart rate and BP slightly elevated\par mild stable angina\par did DNR\par to go to optho - re retinal tear\par \par 6/3/21\par pre-op clearance\par labs ordered\par EKG a fib with q wave v1-3\par compared with old ekg - 5/16/17 - q waves are new\par echo ordered\par suggested fup cardiologist\par \par vital signs are stable\par angina stable\par \par no contraindication to planned repair of macular hole\par on 7/6/21\par low risk procdeure\par \par ok to hold eliquis as per optho\par \par 7/22/21\par macular hole repaired \par now with broken radius - in cast\par BP good\par recovering well\par echo mild AS, dilated LA\par mild pulmonary htn\par on increased toprol to 100 - with good results\par \par 9/30/21\par broken arm recovering - cast off\par BP good\par HR good\par flu vaccine given\par meds renewed\par \par 11/3/21\par pt is well\par BP and hr controlled\par cont same meds\par \par 12/16/21\par pt has made good recovery\par afib and hypertension under control\par no changes in meds made\par \par 1/27/22\par pt has a fib and htn well controlled\par has OA - shoulder\par left calf swelling - is on eliquis\par so doubt dvt\par she is mobile\par \par 3/17/22\par pt is well \par renewed lipitor\par renewed home dnr\par labs in Jan 22 - wnl\par saw cardiologist\par no changes made\par \par 5/19/22\par signed home DNR\par pt agrees\par a fib- under control\par bp controlled\par \par 8/18/22\par pt has been stable\par to get an echo at the end of the month\par no change made to meds\par \par 1/5/23\par pt is slightly tacchy today\par no sign of chf\par lungs clear\par bp and oxygen good\par weight is stable\par has some venous insuff\par cont same meds\par to see dr aaron later this month\par \par 2/9/23\par pt turned 89\par she is alert and independent\par walks with walker\par she is wearing supp hose fro venous insuff\par vitals are all stable\par \par 3/23/23\par pt is doing welll\par very alert\par had check up with cardiologist\par continuing current meds\par vitals are stable\par wearing compression stockings

## 2023-05-11 ENCOUNTER — APPOINTMENT (OUTPATIENT)
Dept: GERIATRICS | Facility: HOME HEALTH | Age: 88
End: 2023-05-11
Payer: MEDICARE

## 2023-05-11 PROCEDURE — 99349 HOME/RES VST EST MOD MDM 40: CPT

## 2023-05-12 VITALS
DIASTOLIC BLOOD PRESSURE: 70 MMHG | SYSTOLIC BLOOD PRESSURE: 125 MMHG | BODY MASS INDEX: 24.33 KG/M2 | WEIGHT: 146.2 LBS | HEART RATE: 80 BPM | OXYGEN SATURATION: 100 %

## 2023-05-12 NOTE — HISTORY OF PRESENT ILLNESS
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\par afib, htn\par varicose veins\par c/o decreased hearing\par \par 19\par f/up\par had labs 3/2019 wnl\par \par 19\par pt is doing well\par c/o occas jabbing pains in rt thigh\par has varicose veins\par needs all med renewed\par \par 19\par occas jabs rt thigh\par varicose veins\par not takin toprol xl 25 - ran out\par palpitations if sh walks too fast\par needs flu v\par supplied health care proxy - discussed advance directives\par \par 11/15/19\par pt c/o pinching pains in legs - shania when on his feet\par does not want pain meds\par has some shortness of breath on walking\par does wear supp hose\par \par 1/10/20\par pt continues to have occas pains in thighs  as above\par did not go to vascular - not that troublesome\par wears supp hose\par has varicose veins\par has afib - no palpitations\par no chest pain, no sob\par \par 20\par pt has been doing well\par discussed advanced directives\par pt decided on home DNR\par no chest pain\par no sob\par no palps\par \par 20\par pt is now dnr and wears a bracelet\par she is feeling well\par and is independent\par occas papitations\par sticking feeling in rt thigh unchanged\par reviewed meds\par \par 20\par pt is well\par rare palpitatons\par occas sticking feeling in thigh\par varicose veins\par reviewed meds and purpose of each\par \par 20\par pt relays one evening she had palpitaitons - did not report\par otherwise well\par less appetite in summer\par edema foot\par diminished hearing\par \par 9/3/20\par no more episodes of palpitations\par no edema\par going to dentist today - needs a filling\par needs flu vaccine today\par \par 10/29/30\par pt reports she went to eye  and was diagnosed with a retinal hole.\par she is being treated with eye drops\par she manages her own meds\par denies chest pain, palps and sob\par has been at  today and was leading procession "running around"\par \par 20\par she and sister are praying re retinal hole\par has vision loss\par LE edema at the end of the day\par occas chest pain on exertion\par \par 21\par sometimes palpitations at night or when walks quickly\par she did walk with me walker up ramp and was quick and smooth\par had walker adjusted to be higher to help with posture\par had labs 21 - wnl\par \par 21\par \par pt notes palpitations if walks fast especially on incline - uses rollator\par some brief chest pains at night\par this is stable - no increase in symptoms\par going to optho later - has hole in retina\par has distortion and spots in left eye\par \par 6/3/21\par pt needs eye surgery to repair macular hole\par needs clearance\par pt is feeling well - able to walk a flight of stairs\par ocas chest pain/gas pains - stable -\par has a fib denies - palpitation or shortness of breath\par she is alert\par forms to fill out\par \par 21\par pt saw Dr Cordova cardiologist increased toprol to 100 mg a day\par ordered echo\par on  had repair of macular hole\par  fell at 2 am going to bathroom - 8 am went to ER - casted and then reset last week\par radius fracutre\par vision is slowly improving\par staying on assisted living side\par not exerting self - trying to stay independent\par no chest pain\par \par 21\par cast is off left arm and it is healing up\par went to eye   - it is slightly better - not completely\par heart - has been stable - no chest pain nor palpitations\par needs flu vaccines\par needs meds renewed\par \par 11/3/21\par pt is now back to her usual residence\par her left wrist is recovering - still some limited rom\par occa palpitations in bed at night\par using rollator\par \par 21\par 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\par \par 22\par pt saw cardiologist for check up\par all ok\par labs pending - only cbc back\par c/o rt shoulder pain since yesterday\par left calf pain and swelling\par declines PT/heat / tylenol\par \par left eye better since surgery\par still diminished vision but better\par \par left wrist recovered\par \par 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[employer]\par \par 3/17/22\par wearing brace on left wrist\par palpitations on exertion and when lies down at night\par \par 22\par pt seen at [place]\par heart "pounds at times" "does the rumba"\par no change in heart symptoms\par self resolves\par left wrist still hurts at times\par occas jabbing pain in thigh\par to see Dr Cordova in July\par poor hearing\par trace edema - wears supp hose\par \par 22\par c/op heart doing the lena at night\par back pains\par pain left wrist\par had some rt sided abdominal pain which has resolved\par LE edema\par saw cardiologist - Dr Cordova - to get an echo on \par \par 10/20/22\par pt is doing well\par has had flu and   covid booster on 22\par is working here\par walks with walker\par \par 23\par pt saw Dr Estrada\par has venous insuf\par given lac hydrin\par And supp hose\par followed by agnieszka for a fib\par few weeks ago chest pain in the night - went away by self\par has appt this month with cardiolgist \par \par \par 23\par pt is wearing supp hose as per Dr Estrada vascular\par lac hydrin\par some mild errythema = left leg\par supp hose make her feel more comfortable\par Dr Cordova - cards iin May \par \par 3/23/23\par pt is well\par did see dr cordova earlier this month\par no changes made\par still gets palpitiations at times shania on exertion\par \par 23\par pt has been well\par wearing supp hose for LE edema\par last labs 22\par has all meds\par wants to be DNR - wearing a bracelet\par no chest pain\par alert

## 2023-05-12 NOTE — ASSESSMENT
[FreeTextEntry1] : pt with htn, afib\par to get hearing checked\par doing well\par \par 5/31/19\par no changes made\par \par 7/26/19\par no changes made\par \par 9/27/19\par reviewed proxy\par flu vaccine given\par restat extra 25 mg toprol\par hr \par \par 11/151/9\par pt seems to have pain  in varicose veins - irregular patterns\par suggested vascular consult\par a fib\par mild sob on exertion\par wearing supp hose\par \par 1/10/20\par decided against vascular consult\par a fib controlled\par BP controlled\par \par 2/28/20\par signed home DNR\par renewed all meds\par \par 4/23/20\par pt is doing well\par no changes made\par \par 5/20/20\par resigned DNR\par \par 7/16/20\par pt is doing well\par cardiac stable\par cleaned ears\par doing well\par no changes in meds\par \par 9/3/20\par doing well\par flu vaccine given\par to go to dentist\par check records re if any blood tests are needed\par \par 10/29/20\par signed DNR\par optho re retinal hole\par check need for labs\par watch BP - slightly high\par heart rate well controlled\par \par 12/29/20\par pt is doing well\par [facility]\par cont meds\par \par 2.25.21\par renewed all meds\par labs good\par angina stable\par to see optho\par renewed DNR\par \par 4/14/21\par pt did not take toprol this am - got busy\par heart rate and BP slightly elevated\par mild stable angina\par did DNR\par to go to optho - re retinal tear\par \par 6/3/21\par pre-op clearance\par labs ordered\par EKG a fib with q wave v1-3\par compared with old ekg - 5/16/17 - q waves are new\par echo ordered\par suggested fup cardiologist\par \par vital signs are stable\par angina stable\par \par no contraindication to planned repair of macular hole\par on 7/6/21\par low risk procdeure\par \par ok to hold eliquis as per optho\par \par 7/22/21\par macular hole repaired \par now with broken radius - in cast\par BP good\par recovering well\par echo mild AS, dilated LA\par mild pulmonary htn\par on increased toprol to 100 - with good results\par \par 9/30/21\par broken arm recovering - cast off\par BP good\par HR good\par flu vaccine given\par meds renewed\par \par 11/3/21\par pt is well\par BP and hr controlled\par cont same meds\par \par 12/16/21\par pt has made good recovery\par afib and hypertension under control\par no changes in meds made\par \par 1/27/22\par pt has a fib and htn well controlled\par has OA - shoulder\par left calf swelling - is on eliquis\par so doubt dvt\par she is mobile\par \par 3/17/22\par pt is well \par renewed lipitor\par renewed home dnr\par labs in Jan 22 - wnl\par saw cardiologist\par no changes made\par \par 5/19/22\par signed home DNR\par pt agrees\par a fib- under control\par bp controlled\par \par 8/18/22\par pt has been stable\par to get an echo at the end of the month\par no change made to meds\par \par 1/5/23\par pt is slightly tacchy today\par no sign of chf\par lungs clear\par bp and oxygen good\par weight is stable\par has some venous insuff\par cont same meds\par to see dr aaron later this month\par \par 2/9/23\par pt turned 89\par she is alert and independent\par walks with walker\par she is wearing supp hose fro venous insuff\par vitals are all stable\par \par 3/23/23\par pt is doing welll\par very alert\par had check up with cardiologist\par continuing current meds\par vitals are stable\par wearing compression stockings\par \par 5/11/23\par doing well\par vss\par signed DNR - pt wishes\par last labs 12/22 - wnl\par renewed calcium

## 2023-05-12 NOTE — PHYSICAL EXAM
[Alert] : alert [Well Nourished] : well nourished [Healthy Appearing] : healthy appearing [Well Developed] : well developed [Normal Voice/Communication] : normal voice/communication [Normal Outer Ear/Nose] : the ears and nose were normal in appearance [Normal Appearance] : the appearance of the neck was normal [No Neck Mass] : no neck mass was observed [No Respiratory Distress] : no respiratory distress [No Acc Muscle Use] : no accessory muscle use [Normal Gait] : normal gait [Normal] : normal skin color and pigmentation [Normal Affect] : the affect was normal [Recent Memory Normal] : recent memory was not impaired [Normal Mood] : the mood was normal [Remote Memory Normal] : remote memory was not impaired [General Appearance - Alert] : alert [General Appearance - In No Acute Distress] : in no acute distress [General Appearance - Well Nourished] : well nourished [General Appearance - Well Developed] : well developed [General Appearance - Well-Appearing] : healthy appearing [Sclera] : the sclera and conjunctiva were normal [PERRL With Normal Accommodation] : pupils were equal in size, round, and reactive to light [Extraocular Movements] : extraocular movements were intact [Normal Oral Mucosa] : normal oral mucosa [No Oral Pallor] : no oral pallor [No Oral Cyanosis] : no oral cyanosis [Outer Ear] : the ears and nose were normal in appearance [Hearing Threshold Finger Rub Not [place]] : hearing was normal [Examination Of The Oral Cavity] : the lips and gums were normal [Neck Appearance] : the appearance of the neck was normal [] : no respiratory distress [Respiration, Rhythm And Depth] : normal respiratory rhythm and effort [Exaggerated Use Of Accessory Muscles For Inspiration] : no accessory muscle use [Auscultation Breath Sounds / Voice Sounds] : lungs were clear to auscultation bilaterally [Heart Sounds] : normal S1 and S2 [Heart Sounds Gallop] : no gallops [Heart Sounds Pericardial Friction Rub] : no pericardial rub [Abdomen Soft] : soft [Abdomen Tenderness] : non-tender [Abdomen Mass (___ Cm)] : no abdominal mass palpated [Cervical Lymph Nodes Enlarged Posterior Bilaterally] : posterior cervical [Cervical Lymph Nodes Enlarged Anterior Bilaterally] : anterior cervical [No CVA Tenderness] : no ~M costovertebral angle tenderness [No Spinal Tenderness] : no spinal tenderness [Abnormal Walk] : normal gait [Nail Clubbing] : no clubbing  or cyanosis of the fingernails [Involuntary Movements] : no involuntary movements were seen [Musculoskeletal - Swelling] : no joint swelling seen [Motor Tone] : muscle strength and tone were normal [Skin Color & Pigmentation] : normal skin color and pigmentation [No Focal Deficits] : no focal deficits [Oriented To Time, Place, And Person] : oriented to person, place, and time [Impaired Insight] : insight and judgment were intact [Affect] : the affect was normal [de-identified] : walking independently with walker [de-identified] : bilateral ankle edema _+1 varicose veins, left calf is sl errythematous [de-identified] : OA hands [FreeTextEntry1] : oa hands,

## 2023-06-28 ENCOUNTER — APPOINTMENT (OUTPATIENT)
Dept: GERIATRICS | Facility: HOME HEALTH | Age: 88
End: 2023-06-28
Payer: MEDICARE

## 2023-06-28 VITALS
WEIGHT: 144.6 LBS | BODY MASS INDEX: 24.06 KG/M2 | OXYGEN SATURATION: 100 % | SYSTOLIC BLOOD PRESSURE: 130 MMHG | TEMPERATURE: 96.8 F | HEART RATE: 79 BPM | DIASTOLIC BLOOD PRESSURE: 70 MMHG

## 2023-06-28 PROCEDURE — 99348 HOME/RES VST EST LOW MDM 30: CPT

## 2023-06-28 NOTE — HISTORY OF PRESENT ILLNESS
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\par afib, htn\par varicose veins\par c/o decreased hearing\par \par 19\par f/up\par had labs 3/2019 wnl\par \par 19\par pt is doing well\par c/o occas jabbing pains in rt thigh\par has varicose veins\par needs all med renewed\par \par 19\par occas jabs rt thigh\par varicose veins\par not takin toprol xl 25 - ran out\par palpitations if sh walks too fast\par needs flu v\par supplied health care proxy - discussed advance directives\par \par 11/15/19\par pt c/o pinching pains in legs - shania when on his feet\par does not want pain meds\par has some shortness of breath on walking\par does wear supp hose\par \par 1/10/20\par pt continues to have occas pains in thighs  as above\par did not go to vascular - not that troublesome\par wears supp hose\par has varicose veins\par has afib - no palpitations\par no chest pain, no sob\par \par 20\par pt has been doing well\par discussed advanced directives\par pt decided on home DNR\par no chest pain\par no sob\par no palps\par \par 20\par pt is now dnr and wears a bracelet\par she is feeling well\par and is independent\par occas papitations\par sticking feeling in rt thigh unchanged\par reviewed meds\par \par 20\par pt is well\par rare palpitatons\par occas sticking feeling in thigh\par varicose veins\par reviewed meds and purpose of each\par \par 20\par pt relays one evening she had palpitaitons - did not report\par otherwise well\par less appetite in summer\par edema foot\par diminished hearing\par \par 9/3/20\par no more episodes of palpitations\par no edema\par going to dentist today - needs a filling\par needs flu vaccine today\par \par 10/29/30\par pt reports she went to eye  and was diagnosed with a retinal hole.\par she is being treated with eye drops\par she manages her own meds\par denies chest pain, palps and sob\par has been at  today and was leading procession "running around"\par \par 20\par she and sister are praying re retinal hole\par has vision loss\par LE edema at the end of the day\par occas chest pain on exertion\par \par 21\par sometimes palpitations at night or when walks quickly\par she did walk with me walker up ramp and was quick and smooth\par had walker adjusted to be higher to help with posture\par had labs 21 - wnl\par \par 21\par \par pt notes palpitations if walks fast especially on incline - uses rollator\par some brief chest pains at night\par this is stable - no increase in symptoms\par going to optho later - has hole in retina\par has distortion and spots in left eye\par \par 6/3/21\par pt needs eye surgery to repair macular hole\par needs clearance\par pt is feeling well - able to walk a flight of stairs\par ocas chest pain/gas pains - stable -\par has a fib denies - palpitation or shortness of breath\par she is alert\par forms to fill out\par \par 21\par pt saw Dr Cordova cardiologist increased toprol to 100 mg a day\par ordered echo\par on  had repair of macular hole\par  fell at 2 am going to bathroom - 8 am went to ER - casted and then reset last week\par radius fracutre\par vision is slowly improving\par staying on assisted living side\par not exerting self - trying to stay independent\par no chest pain\par \par 21\par cast is off left arm and it is healing up\par went to eye   - it is slightly better - not completely\par heart - has been stable - no chest pain nor palpitations\par needs flu vaccines\par needs meds renewed\par \par 11/3/21\par pt is now back to her usual residence\par her left wrist is recovering - still some limited rom\par occa palpitations in bed at night\par using rollator\par \par 21\par 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\par \par 22\par pt saw cardiologist for check up\par all ok\par labs pending - only cbc back\par c/o rt shoulder pain since yesterday\par left calf pain and swelling\par declines PT/heat / tylenol\par \par left eye better since surgery\par still diminished vision but better\par \par left wrist recovered\par \par 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[employer]\par \par 3/17/22\par wearing brace on left wrist\par palpitations on exertion and when lies down at night\par \par 22\par pt seen at [place]\par heart "pounds at times" "does the rumba"\par no change in heart symptoms\par self resolves\par left wrist still hurts at times\par occas jabbing pain in thigh\par to see Dr Cordova in July\par poor hearing\par trace edema - wears supp hose\par \par 22\par c/op heart doing the lean at night\par back pains\par pain left wrist\par had some rt sided abdominal pain which has resolved\par LE edema\par saw cardiologist - Dr Cordova - to get an echo on \par \par 10/20/22\par pt is doing well\par has had flu and   covid booster on 22\par is working here\par walks with walker\par \par 23\par pt saw Dr Estrada\par has venous insuf\par given lac hydrin\par And supp hose\par followed by agnieszka for a fib\par few weeks ago chest pain in the night - went away by self\par has appt this month with cardiolgist \par \par \par 23\par pt is wearing supp hose as per Dr Estrada vascular\par lac hydrin\par some mild errythema = left leg\par supp hose make her feel more comfortable\par Dr Cordova - cards iin May \par \par 3/23/23\par pt is well\par did see dr cordova earlier this month\par no changes made\par still gets palpitiations at times shania on exertion\par \par 23\par pt has been well\par wearing supp hose for LE edema\par last labs 22\par has all meds\par wants to be DNR - wearing a bracelet\par no chest pain\par alert\par \par 23\par pt is well\par wears supp hose\par still has palpitatios on exertion\par tomorrow going to ENT to get ears clear

## 2023-06-28 NOTE — ASSESSMENT
[FreeTextEntry1] : pt with htn, afib\par to get hearing checked\par doing well\par \par 5/31/19\par no changes made\par \par 7/26/19\par no changes made\par \par 9/27/19\par reviewed proxy\par flu vaccine given\par restat extra 25 mg toprol\par hr \par \par 11/151/9\par pt seems to have pain  in varicose veins - irregular patterns\par suggested vascular consult\par a fib\par mild sob on exertion\par wearing supp hose\par \par 1/10/20\par decided against vascular consult\par a fib controlled\par BP controlled\par \par 2/28/20\par signed home DNR\par renewed all meds\par \par 4/23/20\par pt is doing well\par no changes made\par \par 5/20/20\par resigned DNR\par \par 7/16/20\par pt is doing well\par cardiac stable\par cleaned ears\par doing well\par no changes in meds\par \par 9/3/20\par doing well\par flu vaccine given\par to go to dentist\par check records re if any blood tests are needed\par \par 10/29/20\par signed DNR\par optho re retinal hole\par check need for labs\par watch BP - slightly high\par heart rate well controlled\par \par 12/29/20\par pt is doing well\par [facility]\par cont meds\par \par 2.25.21\par renewed all meds\par labs good\par angina stable\par to see optho\par renewed DNR\par \par 4/14/21\par pt did not take toprol this am - got busy\par heart rate and BP slightly elevated\par mild stable angina\par did DNR\par to go to optho - re retinal tear\par \par 6/3/21\par pre-op clearance\par labs ordered\par EKG a fib with q wave v1-3\par compared with old ekg - 5/16/17 - q waves are new\par echo ordered\par suggested fup cardiologist\par \par vital signs are stable\par angina stable\par \par no contraindication to planned repair of macular hole\par on 7/6/21\par low risk procdeure\par \par ok to hold eliquis as per optho\par \par 7/22/21\par macular hole repaired \par now with broken radius - in cast\par BP good\par recovering well\par echo mild AS, dilated LA\par mild pulmonary htn\par on increased toprol to 100 - with good results\par \par 9/30/21\par broken arm recovering - cast off\par BP good\par HR good\par flu vaccine given\par meds renewed\par \par 11/3/21\par pt is well\par BP and hr controlled\par cont same meds\par \par 12/16/21\par pt has made good recovery\par afib and hypertension under control\par no changes in meds made\par \par 1/27/22\par pt has a fib and htn well controlled\par has OA - shoulder\par left calf swelling - is on eliquis\par so doubt dvt\par she is mobile\par \par 3/17/22\par pt is well \par renewed lipitor\par renewed home dnr\par labs in Jan 22 - wnl\par saw cardiologist\par no changes made\par \par 5/19/22\par signed home DNR\par pt agrees\par a fib- under control\par bp controlled\par \par 8/18/22\par pt has been stable\par to get an echo at the end of the month\par no change made to meds\par \par 1/5/23\par pt is slightly tacchy today\par no sign of chf\par lungs clear\par bp and oxygen good\par weight is stable\par has some venous insuff\par cont same meds\par to see dr aaron later this month\par \par 2/9/23\par pt turned 89\par she is alert and independent\par walks with walker\par she is wearing supp hose fro venous insuff\par vitals are all stable\par \par 3/23/23\par pt is doing welll\par very alert\par had check up with cardiologist\par continuing current meds\par vitals are stable\par wearing compression stockings\par \par 5/11/23\par doing well\par vss\par signed DNR - pt wishes\par last labs 12/22 - wnl\par renewed calcium\par \par 6/28/23\par pt is well\par has afib contolled\par htn controlled\par no changes to meds needs\par \par

## 2023-06-28 NOTE — PHYSICAL EXAM
[Alert] : alert [Well Nourished] : well nourished [Healthy Appearing] : healthy appearing [Well Developed] : well developed [Normal Voice/Communication] : normal voice/communication [Normal Outer Ear/Nose] : the ears and nose were normal in appearance [Normal Appearance] : the appearance of the neck was normal [No Neck Mass] : no neck mass was observed [No Respiratory Distress] : no respiratory distress [No Acc Muscle Use] : no accessory muscle use [Normal Gait] : normal gait [Normal] : normal skin color and pigmentation [Normal Affect] : the affect was normal [Recent Memory Normal] : recent memory was not impaired [Normal Mood] : the mood was normal [Remote Memory Normal] : remote memory was not impaired [General Appearance - Alert] : alert [General Appearance - In No Acute Distress] : in no acute distress [General Appearance - Well Nourished] : well nourished [General Appearance - Well Developed] : well developed [General Appearance - Well-Appearing] : healthy appearing [Sclera] : the sclera and conjunctiva were normal [PERRL With Normal Accommodation] : pupils were equal in size, round, and reactive to light [Extraocular Movements] : extraocular movements were intact [Normal Oral Mucosa] : normal oral mucosa [No Oral Pallor] : no oral pallor [No Oral Cyanosis] : no oral cyanosis [Outer Ear] : the ears and nose were normal in appearance [Hearing Threshold Finger Rub Not [place]] : hearing was normal [Examination Of The Oral Cavity] : the lips and gums were normal [Neck Appearance] : the appearance of the neck was normal [] : no respiratory distress [Respiration, Rhythm And Depth] : normal respiratory rhythm and effort [Exaggerated Use Of Accessory Muscles For Inspiration] : no accessory muscle use [Auscultation Breath Sounds / Voice Sounds] : lungs were clear to auscultation bilaterally [Heart Sounds] : normal S1 and S2 [Heart Sounds Gallop] : no gallops [Heart Sounds Pericardial Friction Rub] : no pericardial rub [Abdomen Mass (___ Cm)] : no abdominal mass palpated [Cervical Lymph Nodes Enlarged Posterior Bilaterally] : posterior cervical [Cervical Lymph Nodes Enlarged Anterior Bilaterally] : anterior cervical [No CVA Tenderness] : no ~M costovertebral angle tenderness [No Spinal Tenderness] : no spinal tenderness [Abnormal Walk] : normal gait [Nail Clubbing] : no clubbing  or cyanosis of the fingernails [Involuntary Movements] : no involuntary movements were seen [Musculoskeletal - Swelling] : no joint swelling seen [Motor Tone] : muscle strength and tone were normal [Skin Color & Pigmentation] : normal skin color and pigmentation [No Focal Deficits] : no focal deficits [Oriented To Time, Place, And Person] : oriented to person, place, and time [Impaired Insight] : insight and judgment were intact [Affect] : the affect was normal [Abdomen Tenderness] : non-tender [No Masses] : no abdominal mass palpated [Abdomen Soft] : soft [de-identified] : walking independently with walker [de-identified] : 70s - 80-s irreg [de-identified] : bilateral ankle edema _+1 varicose veins, wears supp hose [de-identified] : OA hands [FreeTextEntry1] : oa hands,

## 2023-08-01 ENCOUNTER — RX RENEWAL (OUTPATIENT)
Age: 88
End: 2023-08-01

## 2023-08-01 RX ORDER — METOPROLOL SUCCINATE 100 MG/1
100 TABLET, EXTENDED RELEASE ORAL
Qty: 90 | Refills: 3 | Status: ACTIVE | COMMUNITY
Start: 2022-08-02 | End: 1900-01-01

## 2023-08-01 RX ORDER — MULTIVIT-MIN/FOLIC/VIT K/LYCOP 400-300MCG
25 MCG TABLET ORAL
Qty: 90 | Refills: 3 | Status: ACTIVE | COMMUNITY
Start: 2022-05-10 | End: 1900-01-01

## 2023-08-10 ENCOUNTER — APPOINTMENT (OUTPATIENT)
Dept: GERIATRICS | Facility: HOME HEALTH | Age: 88
End: 2023-08-10
Payer: MEDICARE

## 2023-08-10 VITALS
SYSTOLIC BLOOD PRESSURE: 135 MMHG | HEART RATE: 74 BPM | OXYGEN SATURATION: 99 % | WEIGHT: 147 LBS | TEMPERATURE: 95.4 F | BODY MASS INDEX: 24.46 KG/M2 | DIASTOLIC BLOOD PRESSURE: 70 MMHG

## 2023-08-10 PROCEDURE — 99349 HOME/RES VST EST MOD MDM 40: CPT

## 2023-08-10 NOTE — REVIEW OF SYSTEMS
[Eyesight Problems] : eyesight problems [Palpitations] : no palpitations [Arthralgias] : arthralgias [Joint Stiffness] : joint stiffness [Negative] : Heme/Lymph [FreeTextEntry5] : palpitations on exertion and at night [FreeTextEntry6] : lower extremity edema [FreeTextEntry7] : had rt sided abd pain - which has resolved [FreeTextEntry9] : left wrist

## 2023-08-10 NOTE — PHYSICAL EXAM
[Alert] : alert [Well Nourished] : well nourished [Healthy Appearing] : healthy appearing [No Acute Distress] : in no acute distress [Well Developed] : well developed [Normal Voice/Communication] : normal voice/communication [Normal Outer Ear/Nose] : the ears and nose were normal in appearance [Normal Appearance] : the appearance of the neck was normal [No Neck Mass] : no neck mass was observed [No Respiratory Distress] : no respiratory distress [No Acc Muscle Use] : no accessory muscle use [No Masses] : no abdominal mass palpated [Normal Gait] : normal gait [Normal] : normal skin color and pigmentation [Normal Affect] : the affect was normal [Recent Memory Normal] : recent memory was not impaired [Normal Mood] : the mood was normal [Remote Memory Normal] : remote memory was not impaired [de-identified] : walking independently with walker [de-identified] : 70s - rreg [de-identified] : bilateral ankle edema _+1 varicose veins, wears supp hose [de-identified] : OA hands [General Appearance - Alert] : alert [General Appearance - In No Acute Distress] : in no acute distress [General Appearance - Well Nourished] : well nourished [General Appearance - Well Developed] : well developed [General Appearance - Well-Appearing] : healthy appearing [Sclera] : the sclera and conjunctiva were normal [PERRL With Normal Accommodation] : pupils were equal in size, round, and reactive to light [Extraocular Movements] : extraocular movements were intact [Normal Oral Mucosa] : normal oral mucosa [No Oral Pallor] : no oral pallor [No Oral Cyanosis] : no oral cyanosis [Outer Ear] : the ears and nose were normal in appearance [Hearing Threshold Finger Rub Not [place]] : hearing was normal [Examination Of The Oral Cavity] : the lips and gums were normal [Neck Appearance] : the appearance of the neck was normal [] : no respiratory distress [Respiration, Rhythm And Depth] : normal respiratory rhythm and effort [Exaggerated Use Of Accessory Muscles For Inspiration] : no accessory muscle use [Auscultation Breath Sounds / Voice Sounds] : lungs were clear to auscultation bilaterally [Heart Sounds] : normal S1 and S2 [Heart Sounds Gallop] : no gallops [Heart Sounds Pericardial Friction Rub] : no pericardial rub [Abdomen Soft] : soft [Abdomen Tenderness] : non-tender [Abdomen Mass (___ Cm)] : no abdominal mass palpated [Cervical Lymph Nodes Enlarged Posterior Bilaterally] : posterior cervical [Cervical Lymph Nodes Enlarged Anterior Bilaterally] : anterior cervical [No CVA Tenderness] : no ~M costovertebral angle tenderness [No Spinal Tenderness] : no spinal tenderness [Abnormal Walk] : normal gait [Nail Clubbing] : no clubbing  or cyanosis of the fingernails [Involuntary Movements] : no involuntary movements were seen [Musculoskeletal - Swelling] : no joint swelling seen [Motor Tone] : muscle strength and tone were normal [FreeTextEntry1] : oa hands, [Skin Color & Pigmentation] : normal skin color and pigmentation [No Focal Deficits] : no focal deficits [Oriented To Time, Place, And Person] : oriented to person, place, and time [Impaired Insight] : insight and judgment were intact [Affect] : the affect was normal

## 2023-08-10 NOTE — HISTORY OF PRESENT ILLNESS
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 afib, htn varicose veins c/o decreased hearing  19 f/up had labs 3/2019 wnl  19 pt is doing well c/o occas jabbing pains in rt thigh has varicose veins needs all med renewed  19 occas jabs rt thigh varicose veins not takin toprol xl 25 - ran out palpitations if sh walks too fast needs flu v supplied health care proxy - discussed advance directives  11/15/19 pt c/o pinching pains in legs - shania when on his feet does not want pain meds has some shortness of breath on walking does wear supp hose  1/10/20 pt continues to have occas pains in thighs  as above did not go to vascular - not that troublesome wears supp hose has varicose veins has afib - no palpitations no chest pain, no sob  20 pt has been doing well discussed advanced directives pt decided on home DNR no chest pain no sob no palps  20 pt is now dnr and wears a bracelet she is feeling well and is independent occas papitations sticking feeling in rt thigh unchanged reviewed meds  20 pt is well rare palpitatons occas sticking feeling in thigh varicose veins reviewed meds and purpose of each  20 pt relays one evening she had palpitaitons - did not report otherwise well less appetite in summer edema foot diminished hearing  9/3/20 no more episodes of palpitations no edema going to dentist today - needs a filling needs flu vaccine today  10/29/30 pt reports she went to eye dr and was diagnosed with a retinal hole. she is being treated with eye drops she manages her own meds denies chest pain, palps and sob has been at  today and was leading procession "running around"  20 she and sister are praying re retinal hole has vision loss LE edema at the end of the day occas chest pain on exertion  21 sometimes palpitations at night or when walks quickly she did walk with me walker up ramp and was quick and smooth had walker adjusted to be higher to help with posture had labs 21 - wnl  21  pt notes palpitations if walks fast especially on incline - uses rollator some brief chest pains at night this is stable - no increase in symptoms going to optho later - has hole in retina has distortion and spots in left eye  6/3/21 pt needs eye surgery to repair macular hole needs clearance pt is feeling well - able to walk a flight of stairs ocas chest pain/gas pains - stable - has a fib denies - palpitation or shortness of breath she is alert forms to fill out  21 pt saw Dr Cordova cardiologist increased toprol to 100 mg a day ordered echo on  had repair of macular hole  fell at 2 am going to bathroom - 8 am went to ER - casted and then reset last week luther reyes vision is slowly improving staying on assisted living side not exerting self - trying to stay independent no chest pain  21 cast is off left arm and it is healing up went to eye dr  - it is slightly better - not completely heart - has been stable - no chest pain nor palpitations needs flu vaccines needs meds renewed  11/3/21 pt is now back to her usual residence her left wrist is recovering - still some limited rom occa palpitations in bed at night using rollator  21 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" aches and pains that move around daily - hip, shoulder, abdomen, left calf is mobile now no palpitations no sob no chest pain  22 pt saw cardiologist for check up all ok labs pending - only cbc back c/o rt shoulder pain since yesterday left calf pain and swelling declines PT/heat / tylenol  left eye better since surgery still diminished vision but better  left wrist recovered  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[employer]  3/17/22 wearing brace on left wrist palpitations on exertion and when lies down at night  22 pt seen at [facility] "pounds at times" "does the rumba" no change in heart symptoms self resolves left wrist still hurts at times occas jabbing pain in thigh to see Dr Cordova in July poor hearing trace edema - wears supp hose  22 c/op heart doing the lena at night back pains pain left wrist had some rt sided abdominal pain which has resolved LE edema saw cardiologist - Dr Cordova - to get an echo on 8/31  10/20/22 pt is doing well has had flu and   covid booster on 22 is working here walks with walker  23 pt saw Dr Estrada has venous insuf given lac hydrin And supp hose followed by agnieszka for a fib few weeks ago chest pain in the night - went away by self has appt this month with cardiolgist    23 pt is wearing supp hose as per Dr Estrada vascular lac hydrin some mild errythema = left leg supp hose make her feel more comfortable Dr Cordova - cards iin May   3/23/23 pt is well did see dr cordova earlier this month no changes made still gets palpitiations at times shania on exertion  23 pt has been well wearing supp hose for LE edema last labs 22 has all meds wants to be DNR - wearing a bracelet no chest pain alert  23 pt is well wears supp hose still has palpitatios on exertion tomorrow going to ENT to get ears clear  8/10/23 if walks fast or takes the stairs has palpitatiosn did go to ENT - toget hearig aide has arthritis in arms, wrist and legs

## 2023-08-10 NOTE — ASSESSMENT
[FreeTextEntry1] : pt with htn, afib to get hearing checked doing well  5/31/19 no changes made  7/26/19 no changes made  9/27/19 reviewed proxy flu vaccine given restat extra 25 mg toprol hr   11/151/9 pt seems to have pain  in varicose veins - irregular patterns suggested vascular consult a fib mild sob on exertion wearing supp hose  1/10/20 decided against vascular consult a fib controlled BP controlled  2/28/20 signed home DNR renewed all meds  4/23/20 pt is doing well no changes made  5/20/20 resigned DNR  7/16/20 pt is doing well cardiac stable cleaned ears doing well no changes in meds  9/3/20 doing well flu vaccine given to go to dentist check records re if any blood tests are needed  10/29/20 signed DNR optho re retinal hole check need for labs watch BP - slightly high heart rate well controlled  12/29/20 pt is doing well ordered [facility]mprehens[facility]  2.25.21 renewed all meds labs good angina stable to see optho renewed DNR  4/14/21 pt did not take toprol this am - got busy heart rate and BP slightly elevated mild stable angina did DNR to go to optho - re retinal tear  6/3/21 pre-op clearance labs ordered EKG a fib with q wave v1-3 compared with old ekg - 5/16/17 - q waves are new echo ordered suggested fup cardiologist  vital signs are stable angina stable  no contraindication to planned repair of macular hole on 7/6/21 low risk procdeure  ok to hold eliquis as per optho  7/22/21 macular hole repaired  now with broken radius - in cast BP good recovering well echo mild AS, dilated LA mild pulmonary htn on increased toprol to 100 - with good results  9/30/21 broken arm recovering - cast off BP good HR good flu vaccine given meds renewed  11/3/21 pt is well BP and hr controlled cont same meds  12/16/21 pt has made good recovery afib and hypertension under control no changes in meds made  1/27/22 pt has a fib and htn well controlled has OA - shoulder left calf swelling - is on eliquis so doubt dvt she is mobile  3/17/22 pt is well  renewed lipitor renewed home dnr labs in Jan 22 - wnl saw cardiologist no changes made  5/19/22 signed home DNR pt agrees a fib- under control bp controlled  8/18/22 pt has been stable to get an echo at the end of the month no change made to meds  1/5/23 pt is slightly tacchy today no sign of chf lungs clear bp and oxygen good weight is stable has some venous insuff cont same meds to see dr aaron later this month  2/9/23 pt turned 89 she is alert and independent walks with walker she is wearing supp hose fro venous insuff vitals are all stable  3/23/23 pt is doing welll very alert had check up with cardiologist continuing current meds vitals are stable wearing compression stockings  5/11/23 doing well vss signed DNR - pt wishes last labs 12/22 - wnl renewed calcium  6/28/23 pt is well has afib contolled htn controlled no changes to meds needs  8/10/23 pt with af ib and hypertension both are under control OA chronic has LE edema - wears supp hose

## 2023-09-10 ENCOUNTER — NON-APPOINTMENT (OUTPATIENT)
Age: 88
End: 2023-09-10

## 2023-09-13 ENCOUNTER — NON-APPOINTMENT (OUTPATIENT)
Age: 88
End: 2023-09-13

## 2023-09-13 ENCOUNTER — APPOINTMENT (OUTPATIENT)
Dept: CARDIOLOGY | Facility: CLINIC | Age: 88
End: 2023-09-13
Payer: MEDICARE

## 2023-09-13 VITALS
SYSTOLIC BLOOD PRESSURE: 146 MMHG | HEIGHT: 65 IN | DIASTOLIC BLOOD PRESSURE: 84 MMHG | WEIGHT: 143 LBS | BODY MASS INDEX: 23.82 KG/M2

## 2023-09-13 DIAGNOSIS — I07.1 RHEUMATIC TRICUSPID INSUFFICIENCY: ICD-10-CM

## 2023-09-13 PROCEDURE — 99214 OFFICE O/P EST MOD 30 MIN: CPT

## 2023-09-13 PROCEDURE — 36415 COLL VENOUS BLD VENIPUNCTURE: CPT

## 2023-09-13 PROCEDURE — 93000 ELECTROCARDIOGRAM COMPLETE: CPT

## 2023-09-14 LAB
ALBUMIN SERPL ELPH-MCNC: 4.4 G/DL
ALP BLD-CCNC: 99 U/L
ALT SERPL-CCNC: 16 U/L
ANION GAP SERPL CALC-SCNC: 11 MMOL/L
AST SERPL-CCNC: 28 U/L
BILIRUB SERPL-MCNC: 0.7 MG/DL
BUN SERPL-MCNC: 14 MG/DL
CALCIUM SERPL-MCNC: 9.8 MG/DL
CHLORIDE SERPL-SCNC: 92 MMOL/L
CHOLEST SERPL-MCNC: 140 MG/DL
CO2 SERPL-SCNC: 30 MMOL/L
CREAT SERPL-MCNC: 0.75 MG/DL
EGFR: 74 ML/MIN/1.73M2
GLUCOSE SERPL-MCNC: 124 MG/DL
HCT VFR BLD CALC: 35.1 %
HDLC SERPL-MCNC: 61 MG/DL
HGB BLD-MCNC: 11.5 G/DL
LDLC SERPL CALC-MCNC: 62 MG/DL
MCHC RBC-ENTMCNC: 28.4 PG
MCHC RBC-ENTMCNC: 32.8 GM/DL
MCV RBC AUTO: 86.7 FL
NONHDLC SERPL-MCNC: 79 MG/DL
NT-PROBNP SERPL-MCNC: 1754 PG/ML
PLATELET # BLD AUTO: 184 K/UL
POTASSIUM SERPL-SCNC: 3.8 MMOL/L
PROT SERPL-MCNC: 7.2 G/DL
RBC # BLD: 4.05 M/UL
RBC # FLD: 15.7 %
SODIUM SERPL-SCNC: 133 MMOL/L
TRIGL SERPL-MCNC: 92 MG/DL
WBC # FLD AUTO: 5.84 K/UL

## 2023-09-22 ENCOUNTER — APPOINTMENT (OUTPATIENT)
Dept: GERIATRICS | Facility: HOME HEALTH | Age: 88
End: 2023-09-22

## 2023-09-27 ENCOUNTER — RX RENEWAL (OUTPATIENT)
Age: 88
End: 2023-09-27

## 2023-09-27 RX ORDER — IBANDRONATE SODIUM 150 MG/1
150 TABLET ORAL
Qty: 3 | Refills: 3 | Status: ACTIVE | COMMUNITY
Start: 2022-09-29 | End: 1900-01-01

## 2023-09-27 RX ORDER — ENALAPRIL MALEATE 5 MG/1
5 TABLET ORAL DAILY
Qty: 90 | Refills: 3 | Status: ACTIVE | COMMUNITY
Start: 2020-06-29 | End: 1900-01-01

## 2023-09-27 RX ORDER — APIXABAN 5 MG/1
5 TABLET, FILM COATED ORAL
Qty: 180 | Refills: 3 | Status: ACTIVE | COMMUNITY
Start: 2022-09-29 | End: 1900-01-01

## 2023-09-27 RX ORDER — ATORVASTATIN CALCIUM 20 MG/1
20 TABLET, FILM COATED ORAL
Qty: 90 | Refills: 3 | Status: ACTIVE | COMMUNITY
Start: 2022-09-29 | End: 1900-01-01

## 2023-11-03 RX ORDER — CHLORTHALIDONE 25 MG/1
25 TABLET ORAL DAILY
Qty: 90 | Refills: 3 | Status: ACTIVE | COMMUNITY
Start: 2022-09-29 | End: 1900-01-01

## 2023-11-16 ENCOUNTER — APPOINTMENT (OUTPATIENT)
Dept: GERIATRICS | Facility: HOME HEALTH | Age: 88
End: 2023-11-16
Payer: MEDICARE

## 2023-11-16 VITALS
DIASTOLIC BLOOD PRESSURE: 70 MMHG | TEMPERATURE: 94.4 F | BODY MASS INDEX: 24.2 KG/M2 | SYSTOLIC BLOOD PRESSURE: 122 MMHG | WEIGHT: 145.4 LBS | OXYGEN SATURATION: 100 % | HEART RATE: 72 BPM

## 2023-11-16 DIAGNOSIS — R06.09 OTHER FORMS OF DYSPNEA: ICD-10-CM

## 2023-11-16 DIAGNOSIS — R60.0 LOCALIZED EDEMA: ICD-10-CM

## 2023-11-16 PROCEDURE — 99349 HOME/RES VST EST MOD MDM 40: CPT

## 2024-02-01 ENCOUNTER — APPOINTMENT (OUTPATIENT)
Dept: GERIATRICS | Facility: HOME HEALTH | Age: 89
End: 2024-02-01
Payer: MEDICARE

## 2024-02-01 VITALS
OXYGEN SATURATION: 100 % | TEMPERATURE: 95.7 F | BODY MASS INDEX: 24.76 KG/M2 | DIASTOLIC BLOOD PRESSURE: 80 MMHG | HEART RATE: 90 BPM | WEIGHT: 148.8 LBS | SYSTOLIC BLOOD PRESSURE: 150 MMHG

## 2024-02-01 DIAGNOSIS — E78.5 HYPERLIPIDEMIA, UNSPECIFIED: ICD-10-CM

## 2024-02-01 PROCEDURE — 99349 HOME/RES VST EST MOD MDM 40: CPT

## 2024-02-01 NOTE — ASSESSMENT
[FreeTextEntry1] : pt with htn, afib to get hearing checked doing well  5/31/19 no changes made  7/26/19 no changes made  9/27/19 reviewed proxy flu vaccine given restat extra 25 mg toprol hr   11/151/9 pt seems to have pain  in varicose veins - irregular patterns suggested vascular consult a fib mild sob on exertion wearing supp hose  1/10/20 decided against vascular consult a fib controlled BP controlled  2/28/20 signed home DNR renewed all meds  4/23/20 pt is doing well no changes made  5/20/20 resigned DNR  7/16/20 pt is doing well cardiac stable cleaned ears doing well no changes in meds  9/3/20 doing well flu vaccine given to go to dentist check records re if any blood tests are needed  10/29/20 signed DNR optho re retinal hole check need for labs watch BP - slightly high heart rate well controlled  12/29/20 pt is doing well ordered cmprehensive lab tests [facility]  2.25.21 renewed all meds labs good angina stable to see optho renewed DNR  4/14/21 pt did not take toprol this am - got busy heart rate and BP slightly elevated mild stable angina did DNR to go to optho - re retinal tear  6/3/21 pre-op clearance labs ordered EKG a fib with q wave v1-3 compared with old ekg - 5/16/17 - q waves are new echo ordered suggested fup cardiologist  vital signs are stable angina stable  no contraindication to planned repair of macular hole on 7/6/21 low risk procdeure  ok to hold eliquis as per optho  7/22/21 macular hole repaired  now with broken radius - in cast BP good recovering well echo mild AS, dilated LA mild pulmonary htn on increased toprol to 100 - with good results  9/30/21 broken arm recovering - cast off BP good HR good flu vaccine given meds renewed  11/3/21 pt is well BP and hr controlled cont same meds  12/16/21 pt has made good recovery afib and hypertension under control no changes in meds made  1/27/22 pt has a fib and htn well controlled has OA - shoulder left calf swelling - is on eliquis so doubt dvt she is mobile  3/17/22 pt is well  renewed lipitor renewed home dnr labs in Jan 22 - wnl saw cardiologist no changes made  5/19/22 signed home DNR pt agrees a fib- under control bp controlled  8/18/22 pt has been stable to get an echo at the end of the month no change made to meds  1/5/23 pt is slightly tacchy today no sign of chf lungs clear bp and oxygen good weight is stable has some venous insuff cont same meds to see dr aaron later this month  2/9/23 pt turned 89 she is alert and independent walks with walker she is wearing supp hose fro venous insuff vitals are all stable  3/23/23 pt is doing welll very alert had check up with cardiologist continuing current meds vitals are stable wearing compression stockings  5/11/23 doing well vss signed DNR - pt wishes last labs 12/22 - wnl renewed calcium  6/28/23 pt is well has afib contolled htn controlled no changes to meds needs  8/10/23 pt with af fib and hypertension both are under control OA chronic has LE edema - wears supp hose  2/1/24 pt has been well stable some pains in foot occas declines tylenol no change in meds A fib is stable  Bp sl high today no chf

## 2024-02-01 NOTE — HISTORY OF PRESENT ILLNESS
[FreeTextEntry1] : 6/30/22 had some pain in rt shoulder - relief with bengay and tylenol left wrist hurt  to see dr aaron on july 13 -  no new cardiac complaints  11/16/23 pt sometimes has palpitations - does the  rumba sob on exertion sometimes sharp pains in rt thigh has varicaose veins  c/o urinary frequency nocturia does not drink after supper  choeslterol, cmet , cbc are normal bnp elvated   2/1/24 pt has been well has had pain in rt foot awakened by drilling and sawing  feelings a few  time in foot no injury rt knee hurts at times uses bengay does not want tylenol

## 2024-02-01 NOTE — REVIEW OF SYSTEMS
[Loss Of Hearing] : hearing loss [Arthralgias] : arthralgias [Negative] : Heme/Lymph [FreeTextEntry5] : palpitations if walks     fast

## 2024-02-01 NOTE — PHYSICAL EXAM
[Alert] : alert [Well Nourished] : well nourished [Healthy Appearing] : healthy appearing [Well Developed] : well developed [Normal Voice/Communication] : normal voice/communication [Normal Oral Mucosa] : normal oral mucosa [No Oral Pallor] : no oral pallor [Normal Outer Ear/Nose] : the ears and nose were normal in appearance [Normal Appearance] : the appearance of the neck was normal [No Neck Mass] : no neck mass was observed [No Respiratory Distress] : no respiratory distress [No Acc Muscle Use] : no accessory muscle use [Respiration, Rhythm And Depth] : normal respiratory rhythm and effort [Auscultation Breath Sounds / Voice Sounds] : lungs were clear to auscultation bilaterally [Abdomen Tenderness] : non-tender [No Masses] : no abdominal mass palpated [Abdomen Soft] : soft [Normal Gait] : normal gait [Involuntary Movements] : no involuntary movements were seen [Normal] : normal skin color and pigmentation [Oriented To Time, Place, And Person] : oriented to person, place, and time [Normal Affect] : the affect was normal [Recent Memory Normal] : recent memory was not impaired [Normal Mood] : the mood was normal [Remote Memory Normal] : remote memory was not impaired [de-identified] : walking independently with walker [de-identified] : 70s - rreg [de-identified] : bilateral ankle edema _+1 varicose veins, wears supp hose [de-identified] : OA hands

## 2024-05-09 ENCOUNTER — APPOINTMENT (OUTPATIENT)
Dept: GERIATRICS | Facility: HOME HEALTH | Age: 89
End: 2024-05-09
Payer: MEDICARE

## 2024-05-09 VITALS
TEMPERATURE: 95.2 F | HEART RATE: 80 BPM | DIASTOLIC BLOOD PRESSURE: 70 MMHG | SYSTOLIC BLOOD PRESSURE: 130 MMHG | OXYGEN SATURATION: 99 %

## 2024-05-09 DIAGNOSIS — I27.20 PULMONARY HYPERTENSION, UNSPECIFIED: ICD-10-CM

## 2024-05-09 PROCEDURE — 99349 HOME/RES VST EST MOD MDM 40: CPT

## 2024-05-09 NOTE — ASSESSMENT
[FreeTextEntry1] : pt with htn, afib to get hearing checked doing well  5/31/19 no changes made  7/26/19 no changes made  9/27/19 reviewed proxy flu vaccine given restat extra 25 mg toprol hr   11/151/9 pt seems to have pain  in varicose veins - irregular patterns suggested vascular consult a fib mild sob on exertion wearing supp hose  1/10/20 decided against vascular consult a fib controlled BP controlled  2/28/20 signed home DNR renewed all meds  4/23/20 pt is doing well no changes made  5/20/20 resigned DNR  7/16/20 pt is doing well cardiac stable cleaned ears doing well no changes in meds  9/3/20 doing well flu vaccine given to go to dentist check records re if any blood tests are needed  10/29/20 signed DNR optho re retinal hole check need for labs watch BP - slightly high heart rate well controlled  12/29/20 pt is doing well ordered cmprehensive [facility]  2.25.21 renewed all meds labs good angina stable to see optho renewed DNR  4/14/21 pt did not take toprol this am - got busy heart rate and BP slightly elevated mild stable angina did DNR to go to optho - re retinal tear  6/3/21 pre-op clearance labs ordered EKG a fib with q wave v1-3 compared with old ekg - 5/16/17 - q waves are new echo ordered suggested fup cardiologist  vital signs are stable angina stable  no contraindication to planned repair of macular hole on 7/6/21 low risk procdeure  ok to hold eliquis as per optho  7/22/21 macular hole repaired  now with broken radius - in cast BP good recovering well echo mild AS, dilated LA mild pulmonary htn on increased toprol to 100 - with good results  9/30/21 broken arm recovering - cast off BP good HR good flu vaccine given meds renewed  11/3/21 pt is well BP and hr controlled cont same meds  12/16/21 pt has made good recovery afib and hypertension under control no changes in meds made  1/27/22 pt has a fib and htn well controlled has OA - shoulder left calf swelling - is on eliquis so doubt dvt she is mobile  3/17/22 pt is well  renewed lipitor renewed home dnr labs in Jan 22 - wnl saw cardiologist no changes made  5/19/22 signed home DNR pt agrees a fib- under control bp controlled  8/18/22 pt has been stable to get an echo at the end of the month no change made to meds  1/5/23 pt is slightly tacchy today no sign of chf lungs clear bp and oxygen good weight is stable has some venous insuff cont same meds to see dr aaron later this month  2/9/23 pt turned 89 she is alert and independent walks with walker she is wearing supp hose fro venous insuff vitals are all stable  3/23/23 pt is doing welll very alert had check up with cardiologist continuing current meds vitals are stable wearing compression stockings  5/11/23 doing well vss signed DNR - pt wishes last labs 12/22 - wnl renewed calcium  6/28/23 pt is well has afib contolled htn controlled no changes to meds needs  8/10/23 pt with af ib and hypertension both are under control OA chronic has LE edema - wears supp hose   5/9/24 pt with shingles still not quite crusted has viral symptoms but little pain was too late for antivirals by time I was informed (6 days)  a fiba nd BP are controlled

## 2024-05-09 NOTE — REVIEW OF SYSTEMS
[Feeling Poorly] : feeling poorly [Eyesight Problems] : eyesight problems [Loss Of Hearing] : hearing loss [Heart Rate Is Fast] : fast heart rate [Arthralgias] : arthralgias [Skin Lesions] : skin lesion [Difficulty Walking] : difficulty walking [Negative] : Heme/Lymph [Feeling Tired] : feeling tired [FreeTextEntry2] : shingles [de-identified] : shingles rt side of trunk

## 2024-05-09 NOTE — HISTORY OF PRESENT ILLNESS
[FreeTextEntry1] : 6/30/22 had some pain in rt shoulder - relief with bengay and tylenol left wrist hurt  to see dr aaron on july 13 -  no new cardiac complaints  11/16/23 pt sometimes has palpitations - does the  rumba sob on exertion sometimes sharp pains in rt thigh has varicaose veins  c/o urinary frequency nocturia does not drink after supper  choeslterol, cmet , cbc are normal bnp elvated   2/1/24 pt has been well has had pain in rt foot awakened by drilling and sawing  feelings a few  time in foot no injury rt knee hurts at times uses bengay does not want tylenol  5/9/24 contacted monday by nurse pt with large shingles rash accross right side trunk - photo sent to me it was the sixth day of shingles - too late to start anti-virals she has been isolated in her room at assisted living feels fatigued not pain - has tylenol prn

## 2024-05-09 NOTE — PHYSICAL EXAM
[Alert] : alert [Well Nourished] : well nourished [Healthy Appearing] : healthy appearing [Well Developed] : well developed [Normal Voice/Communication] : normal voice/communication [Normal] : the ears and nose were normal in appearance [Normal Oral Mucosa] : normal oral mucosa [No Oral Pallor] : no oral pallor [Normal Outer Ear/Nose] : the ears and nose were normal in appearance [Normal Appearance] : the appearance of the neck was normal [No Neck Mass] : no neck mass was observed [No Respiratory Distress] : no respiratory distress [No Acc Muscle Use] : no accessory muscle use [Respiration, Rhythm And Depth] : normal respiratory rhythm and effort [Auscultation Breath Sounds / Voice Sounds] : lungs were clear to auscultation bilaterally [Abdomen Tenderness] : non-tender [No Masses] : no abdominal mass palpated [Abdomen Soft] : soft [Normal Gait] : normal gait [Involuntary Movements] : no involuntary movements were seen [Oriented To Time, Place, And Person] : oriented to person, place, and time [Normal Affect] : the affect was normal [Recent Memory Normal] : recent memory was not impaired [Normal Mood] : the mood was normal [Remote Memory Normal] : remote memory was not impaired [de-identified] : walking independently with walker [de-identified] : 70s - rreg [de-identified] : bilateral trace ankle edema _+1 varicose veins, wears supp hose [de-identified] : OA hands [de-identified] : shingles rash rt side of pelvis area - some scabbed - still few vesicals

## 2024-06-27 ENCOUNTER — APPOINTMENT (OUTPATIENT)
Dept: GERIATRICS | Facility: HOME HEALTH | Age: 89
End: 2024-06-27
Payer: MEDICARE

## 2024-06-27 VITALS
WEIGHT: 135.4 LBS | HEART RATE: 86 BPM | DIASTOLIC BLOOD PRESSURE: 60 MMHG | SYSTOLIC BLOOD PRESSURE: 130 MMHG | TEMPERATURE: 94.6 F | BODY MASS INDEX: 22.53 KG/M2 | OXYGEN SATURATION: 97 %

## 2024-06-27 DIAGNOSIS — I48.91 UNSPECIFIED ATRIAL FIBRILLATION: ICD-10-CM

## 2024-06-27 DIAGNOSIS — M19.90 UNSPECIFIED OSTEOARTHRITIS, UNSPECIFIED SITE: ICD-10-CM

## 2024-06-27 DIAGNOSIS — I10 ESSENTIAL (PRIMARY) HYPERTENSION: ICD-10-CM

## 2024-06-27 DIAGNOSIS — I87.2 VENOUS INSUFFICIENCY (CHRONIC) (PERIPHERAL): ICD-10-CM

## 2024-06-27 DIAGNOSIS — B02.9 ZOSTER W/OUT COMPLICATIONS: ICD-10-CM

## 2024-06-27 DIAGNOSIS — I25.10 ATHEROSCLEROTIC HEART DISEASE OF NATIVE CORONARY ARTERY W/OUT ANGINA PECTORIS: ICD-10-CM

## 2024-06-27 PROCEDURE — 99349 HOME/RES VST EST MOD MDM 40: CPT

## 2024-06-28 ENCOUNTER — LABORATORY RESULT (OUTPATIENT)
Age: 89
End: 2024-06-28

## 2024-07-01 ENCOUNTER — LABORATORY RESULT (OUTPATIENT)
Age: 89
End: 2024-07-01

## 2024-07-02 ENCOUNTER — LABORATORY RESULT (OUTPATIENT)
Age: 89
End: 2024-07-02

## 2024-07-22 ENCOUNTER — RX RENEWAL (OUTPATIENT)
Age: 89
End: 2024-07-22

## 2024-08-05 ENCOUNTER — RX RENEWAL (OUTPATIENT)
Age: 89
End: 2024-08-05

## 2024-08-08 ENCOUNTER — APPOINTMENT (OUTPATIENT)
Dept: GERIATRICS | Facility: HOME HEALTH | Age: 89
End: 2024-08-08

## 2024-08-08 PROCEDURE — 99349 HOME/RES VST EST MOD MDM 40: CPT

## 2024-08-08 NOTE — PHYSICAL EXAM
[Alert] : alert [Well Nourished] : well nourished [Healthy Appearing] : healthy appearing [Well Developed] : well developed [Normal Voice/Communication] : normal voice/communication [Normal] : the ears and nose were normal in appearance [Normal Oral Mucosa] : normal oral mucosa [No Oral Pallor] : no oral pallor [Normal Outer Ear/Nose] : the ears and nose were normal in appearance [Normal Appearance] : the appearance of the neck was normal [No Neck Mass] : no neck mass was observed [No Respiratory Distress] : no respiratory distress [No Acc Muscle Use] : no accessory muscle use [Respiration, Rhythm And Depth] : normal respiratory rhythm and effort [Auscultation Breath Sounds / Voice Sounds] : lungs were clear to auscultation bilaterally [No Masses] : no abdominal mass palpated [Abdomen Soft] : soft [Normal Gait] : normal gait [Involuntary Movements] : no involuntary movements were seen [Oriented To Time, Place, And Person] : oriented to person, place, and time [Normal Affect] : the affect was normal [Recent Memory Normal] : recent memory was not impaired [Normal Mood] : the mood was normal [Remote Memory Normal] : remote memory was not impaired [de-identified] : walking independently with walker [de-identified] : 70s - rreg [de-identified] : bilateral trace ankle edema _+1 varicose veins, wears supp hose [de-identified] : RLQ shingles scars - mild tender [de-identified] : OA hands [de-identified] : shingles rash rt side of pelvis area - healed

## 2024-08-08 NOTE — ASSESSMENT
[FreeTextEntry1] : pt with htn, afib to get hearing checked doing well  5/31/19 no changes made  7/26/19 no changes made  9/27/19 reviewed proxy flu vaccine given restat extra 25 mg toprol hr   11/151/9 pt seems to have pain  in varicose veins - irregular patterns suggested vascular consult a fib mild sob on exertion wearing supp hose  1/10/20 decided against vascular consult a fib controlled BP controlled  2/28/20 signed home DNR renewed all meds  4/23/20 pt is doing well no changes made  5/20/20 resigned DNR  7/16/20 pt is doing well cardiac stable cleaned ears doing well no changes in meds  9/3/20 doing well flu vaccine given to go to dentist check records re if any blood tests are needed  10/29/20 signed DNR optho re retinal hole check need for labs watch BP - slightly high heart rate well controlled  12/29/20 pt is doing well ordered cmprehensive lab tests to be done at [facility]  2.25.21 renewed all meds labs good angina stable to see optho renewed DNR  4/14/21 pt did not take toprol this am - got busy heart rate and BP slightly elevated mild stable angina did DNR to go to optho - re retinal tear  6/3/21 pre-op clearance labs ordered EKG a fib with q wave v1-3 compared with old ekg - 5/16/17 - q waves are new echo ordered suggested fup cardiologist  vital signs are stable angina stable  no contraindication to planned repair of macular hole on 7/6/21 low risk procdeure  ok to hold eliquis as per optho  7/22/21 macular hole repaired  now with broken radius - in cast BP good recovering well echo mild AS, dilated LA mild pulmonary htn on increased toprol to 100 - with good results  9/30/21 broken arm recovering - cast off BP good HR good flu vaccine given meds renewed  11/3/21 pt is well BP and hr controlled cont same meds  12/16/21 pt has made good recovery afib and hypertension under control no changes in meds made  1/27/22 pt has a fib and htn well controlled has OA - shoulder left calf swelling - is on eliquis so doubt dvt she is mobile  3/17/22 pt is well  renewed lipitor renewed home dnr labs in Jan 22 - wnl saw cardiologist no changes made  5/19/22 signed home DNR pt agrees a fib- under control bp controlled  8/18/22 pt has been stable to get an echo at the end of the month no change made to meds  1/5/23 pt is slightly tacchy today no sign of chf lungs clear bp and oxygen good weight is stable has some venous insuff cont same meds to see dr aaron later this month  2/9/23 pt turned 89 she is alert and independent walks with walker she is wearing supp hose fro venous insuff vitals are all stable  3/23/23 pt is doing welll very alert had check up with cardiologist continuing current meds vitals are stable wearing compression stockings  5/11/23 doing well vss signed DNR - pt wishes last labs 12/22 - wnl renewed calcium  6/28/23 pt is well has afib contolled htn controlled no changes to meds needs  8/10/23 pt with af ib and hypertension both are under control OA chronic has LE edema - wears supp hose   5/9/24 pt with shingles still not quite crusted has viral symptoms but little pain was too late for antivirals by time I was informed (6 days)  a fiba nd BP are controlled  6/27/24 pt has lost a lot of weight with shingles rash is healed slight pain BP, HR, O2 are all controlled a fib controlled will check blood here  8/8/24 pt gets constipated will add senna got covid booster afib rate controlled BP controlled blood results reviewed and wnl

## 2024-08-08 NOTE — HISTORY OF PRESENT ILLNESS
Rebekah Ruby Grimes cancelled her 6 month follow-up appointment on 1/31/19 due to the weather.  Patient was also seen 1/29/19 in Urgent Care and is to follow-up with PCP in 2 weeks.  Dr. Velez's next office visit is not until 2/26/19.    Please review and advise if patient can be seen sooner.    Thank you   [FreeTextEntry1] : 6/30/22 had some pain in rt shoulder - relief with bengay and tylenol left wrist hurt  to see dr aaron on july 13 -  no new cardiac complaints  11/16/23 pt sometimes has palpitations - does the  rumba sob on exertion sometimes sharp pains in rt thigh has varicaose veins  c/o urinary frequency nocturia does not drink after supper  choeslterol, cmet , cbc are normal bnp elvated   2/1/24 pt has been well has had pain in rt foot awakened by drilling and sawing  feelings a few  time in foot no injury rt knee hurts at times uses bengay does not want tylenol  5/9/24 contacted monday by nurse pt with large shingles rash accross right side trunk - photo sent to me it was the sixth day of shingles - too late to start anti-virals she has been isolated in her room at assisted living feels fatigued not pain - has tylenol prn   6/27/24 still has jabs from shingles from last month lost appetite - but likes breakfast heart still does rumba and mitchell  8/8/24 occas shingles pains does not need pain meds still no appetite likes breakfast sometimes has palpitations at times when she gets constipated - and strains gets rapid heart rate

## 2024-08-08 NOTE — REVIEW OF SYSTEMS
[Eyesight Problems] : eyesight problems [Loss Of Hearing] : hearing loss [Heart Rate Is Fast] : fast heart rate [SOB on Exertion] : shortness of breath during exertion [Arthralgias] : arthralgias [Difficulty Walking] : difficulty walking [Negative] : Heme/Lymph [As Noted in HPI] : as noted in HPI [Constipation] : constipation

## 2024-09-08 PROBLEM — K59.01 SLOW TRANSIT CONSTIPATION: Status: ACTIVE | Noted: 2024-08-08

## 2024-09-08 PROBLEM — B02.29 POST HERPETIC NEURALGIA: Status: ACTIVE | Noted: 2024-08-08

## 2024-09-11 ENCOUNTER — APPOINTMENT (OUTPATIENT)
Dept: CARDIOLOGY | Facility: CLINIC | Age: 89
End: 2024-09-11
Payer: MEDICARE

## 2024-09-11 ENCOUNTER — NON-APPOINTMENT (OUTPATIENT)
Age: 89
End: 2024-09-11

## 2024-09-11 VITALS
WEIGHT: 145 LBS | SYSTOLIC BLOOD PRESSURE: 126 MMHG | HEIGHT: 65 IN | BODY MASS INDEX: 24.16 KG/M2 | DIASTOLIC BLOOD PRESSURE: 80 MMHG

## 2024-09-11 DIAGNOSIS — E78.5 HYPERLIPIDEMIA, UNSPECIFIED: ICD-10-CM

## 2024-09-11 DIAGNOSIS — I48.91 UNSPECIFIED ATRIAL FIBRILLATION: ICD-10-CM

## 2024-09-11 DIAGNOSIS — R60.0 LOCALIZED EDEMA: ICD-10-CM

## 2024-09-11 DIAGNOSIS — I25.10 ATHEROSCLEROTIC HEART DISEASE OF NATIVE CORONARY ARTERY W/OUT ANGINA PECTORIS: ICD-10-CM

## 2024-09-11 DIAGNOSIS — I27.20 PULMONARY HYPERTENSION, UNSPECIFIED: ICD-10-CM

## 2024-09-11 DIAGNOSIS — I07.1 RHEUMATIC TRICUSPID INSUFFICIENCY: ICD-10-CM

## 2024-09-11 DIAGNOSIS — I10 ESSENTIAL (PRIMARY) HYPERTENSION: ICD-10-CM

## 2024-09-11 DIAGNOSIS — I87.2 VENOUS INSUFFICIENCY (CHRONIC) (PERIPHERAL): ICD-10-CM

## 2024-09-11 DIAGNOSIS — R06.09 OTHER FORMS OF DYSPNEA: ICD-10-CM

## 2024-09-11 PROCEDURE — 99215 OFFICE O/P EST HI 40 MIN: CPT | Mod: 25

## 2024-09-11 PROCEDURE — 36415 COLL VENOUS BLD VENIPUNCTURE: CPT

## 2024-09-11 PROCEDURE — 93000 ELECTROCARDIOGRAM COMPLETE: CPT

## 2024-09-11 NOTE — HISTORY OF PRESENT ILLNESS
[FreeTextEntry1] : Ms Layton was referred by Dr Ramos, preop- she had   eye surgery on 7/6/2021. She is  followed for atrial fibrillation.  There have been no hospitalizations since last visit. She has LASSITER, denies pnd, orthopnea or pedal edema, She has had palpitations, sometimes her heart is "doing the rummba". She denies chest pain, dyspnea at rest ,  or syncope.

## 2024-09-11 NOTE — CARDIOLOGY SUMMARY
[de-identified] : 9/11/24--atrial fibrillation, possible old asmi [de-identified] : nuclear 2016- normal, ef 72% [de-identified] : 8/31/22-severely dilated RA/LA, lvh,,mod  mr severe TR. moderate IN, mild AI,elevated RAP, dilated RV [de-identified] : chest ct 2018- coronary calcifications aorta 4.3 cm

## 2024-09-12 LAB
ALBUMIN SERPL ELPH-MCNC: 4.1 G/DL
ALP BLD-CCNC: 117 U/L
ALT SERPL-CCNC: 25 U/L
ANION GAP SERPL CALC-SCNC: 14 MMOL/L
AST SERPL-CCNC: 34 U/L
BILIRUB SERPL-MCNC: 0.8 MG/DL
BUN SERPL-MCNC: 16 MG/DL
CALCIUM SERPL-MCNC: 9.3 MG/DL
CHLORIDE SERPL-SCNC: 86 MMOL/L
CO2 SERPL-SCNC: 27 MMOL/L
CREAT SERPL-MCNC: 0.79 MG/DL
EGFR: 69 ML/MIN/1.73M2
GLUCOSE SERPL-MCNC: 102 MG/DL
POTASSIUM SERPL-SCNC: 3.4 MMOL/L
PROT SERPL-MCNC: 6.7 G/DL
SODIUM SERPL-SCNC: 127 MMOL/L

## 2024-09-20 ENCOUNTER — APPOINTMENT (OUTPATIENT)
Dept: CARDIOLOGY | Facility: CLINIC | Age: 89
End: 2024-09-20

## 2024-09-24 NOTE — CARDIOLOGY SUMMARY
[de-identified] : 9/11/24--atrial fibrillation, possible old asmi [de-identified] : nuclear 2016- normal, ef 72% [de-identified] : 8/31/22-severely dilated RA/LA, lvh,,mod  mr severe TR. moderate [place], mild AI,elevated RAP, dilated RV [de-identified] : chest ct 2018- coronary calcifications aorta 4.3 cm

## 2024-09-24 NOTE — HISTORY OF PRESENT ILLNESS
[FreeTextEntry1] : 89 year old female with atrial fibrillation on Eliquis, hypertension, hyperlipidemia, coronary artery calcification, pulmonary HTN.

## 2024-09-24 NOTE — REASON FOR VISIT
[FreeTextEntry1] : Xuan Aguilarashleeefrain presents for 1 week follow up visit and nonfasting laboratories.   Last week's lab show hyponatremia and hypokalemia. Chlorthalidone was discontinued.  MsNehal Dunlapefrain reports

## 2024-09-27 ENCOUNTER — LABORATORY RESULT (OUTPATIENT)
Age: 89
End: 2024-09-27

## 2024-09-27 ENCOUNTER — APPOINTMENT (OUTPATIENT)
Dept: CARDIOLOGY | Facility: CLINIC | Age: 89
End: 2024-09-27
Payer: MEDICARE

## 2024-09-27 VITALS
HEIGHT: 65 IN | OXYGEN SATURATION: 99 % | WEIGHT: 153.56 LBS | HEART RATE: 84 BPM | DIASTOLIC BLOOD PRESSURE: 80 MMHG | BODY MASS INDEX: 25.58 KG/M2 | SYSTOLIC BLOOD PRESSURE: 130 MMHG

## 2024-09-27 DIAGNOSIS — I10 ESSENTIAL (PRIMARY) HYPERTENSION: ICD-10-CM

## 2024-09-27 DIAGNOSIS — I48.91 UNSPECIFIED ATRIAL FIBRILLATION: ICD-10-CM

## 2024-09-27 DIAGNOSIS — E78.5 HYPERLIPIDEMIA, UNSPECIFIED: ICD-10-CM

## 2024-09-27 PROCEDURE — 36415 COLL VENOUS BLD VENIPUNCTURE: CPT

## 2024-09-27 PROCEDURE — 99214 OFFICE O/P EST MOD 30 MIN: CPT | Mod: 25

## 2024-09-28 LAB
ANION GAP SERPL CALC-SCNC: 15 MMOL/L
BUN SERPL-MCNC: 16 MG/DL
CALCIUM SERPL-MCNC: 9.7 MG/DL
CHLORIDE SERPL-SCNC: 88 MMOL/L
CO2 SERPL-SCNC: 23 MMOL/L
CREAT SERPL-MCNC: 0.78 MG/DL
EGFR: 70 ML/MIN/1.73M2
GLUCOSE SERPL-MCNC: 108 MG/DL
POTASSIUM SERPL-SCNC: 4.7 MMOL/L
SODIUM SERPL-SCNC: 127 MMOL/L

## 2024-09-30 NOTE — HISTORY OF PRESENT ILLNESS
[FreeTextEntry1] : 89 year old female with hypertension, hyperlipidemia, atrial fibrillation on Eliquis, pulmonary hypertension, pulmonary nodules, Raynaud's disease.

## 2024-09-30 NOTE — CARDIOLOGY SUMMARY
[de-identified] : 9/11/24--atrial fibrillation, possible old asmi [de-identified] : nuclear 2016- normal, ef 72% [de-identified] : 8/31/22-severely dilated RA/LA, lvh,,mod  mr severe TR. moderate SC, mild AI,elevated RAP, dilated RV [de-identified] : chest ct 2018- coronary calcifications aorta 4.3 cm

## 2024-09-30 NOTE — CARDIOLOGY SUMMARY
[de-identified] : 9/11/24--atrial fibrillation, possible old asmi [de-identified] : nuclear 2016- normal, ef 72% [de-identified] : 8/31/22-severely dilated RA/LA, lvh,,mod  mr severe TR. moderate [place], mild AI,elevated RAP, dilated RV [de-identified] : chest ct 2018- coronary calcifications aorta 4.3 cm

## 2024-09-30 NOTE — PHYSICAL EXAM
[No Acute Distress] : no acute distress [Normal S1, S2] : normal S1, S2 [Clear Lung Fields] : clear lung fields [Good Air Entry] : good air entry [No Respiratory Distress] : no respiratory distress  [Edema ___] : edema [unfilled] [Moves all extremities] : moves all extremities [Normal Speech] : normal speech [Alert and Oriented] : alert and oriented [de-identified] : walks with a cane

## 2024-09-30 NOTE — PHYSICAL EXAM
[No Acute Distress] : no acute distress [Normal S1, S2] : normal S1, S2 [Clear Lung Fields] : clear lung fields [Good Air Entry] : good air entry [No Respiratory Distress] : no respiratory distress  [Edema ___] : edema [unfilled] [Moves all extremities] : moves all extremities [Normal Speech] : normal speech [Alert and Oriented] : alert and oriented [de-identified] : walks with a cane

## 2024-09-30 NOTE — REASON FOR VISIT
[FreeTextEntry1] : Xuan Grullon presents for 2 week follow up visit and nonfasting laboratories.  Labs at previous visit showed hyponatremia and hypokalemia, chlorthalidone on hold.   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 She has no acute symptoms of chest pain, dizziness or syncope. She reports occasional palpitations and LASSITER.

## 2024-09-30 NOTE — REVIEW OF SYSTEMS
[Weight Gain (___ Lbs)] : [unfilled] ~Ulb weight gain [Dyspnea on exertion] : dyspnea during exertion [Lower Ext Edema] : lower extremity edema [Palpitations] : palpitations [Chest Discomfort] : no chest discomfort [Syncope] : no syncope [Cough] : no cough [Wheezing] : no wheezing [Rash] : no rash [Dizziness] : no dizziness [Confusion] : no confusion was observed [Easy Bleeding] : no tendency for easy bleeding [Easy Bruising] : no tendency for easy bruising

## 2024-10-01 DIAGNOSIS — E87.1 HYPO-OSMOLALITY AND HYPONATREMIA: ICD-10-CM

## 2024-10-11 ENCOUNTER — APPOINTMENT (OUTPATIENT)
Dept: CARDIOLOGY | Facility: CLINIC | Age: 89
End: 2024-10-11
Payer: MEDICARE

## 2024-10-11 VITALS
BODY MASS INDEX: 24.16 KG/M2 | DIASTOLIC BLOOD PRESSURE: 74 MMHG | SYSTOLIC BLOOD PRESSURE: 130 MMHG | HEIGHT: 65 IN | OXYGEN SATURATION: 99 % | WEIGHT: 145 LBS | HEART RATE: 66 BPM

## 2024-10-11 DIAGNOSIS — I48.91 UNSPECIFIED ATRIAL FIBRILLATION: ICD-10-CM

## 2024-10-11 DIAGNOSIS — I10 ESSENTIAL (PRIMARY) HYPERTENSION: ICD-10-CM

## 2024-10-11 DIAGNOSIS — I25.10 ATHEROSCLEROTIC HEART DISEASE OF NATIVE CORONARY ARTERY W/OUT ANGINA PECTORIS: ICD-10-CM

## 2024-10-11 DIAGNOSIS — R60.0 LOCALIZED EDEMA: ICD-10-CM

## 2024-10-11 PROCEDURE — 36415 COLL VENOUS BLD VENIPUNCTURE: CPT

## 2024-10-11 PROCEDURE — 99214 OFFICE O/P EST MOD 30 MIN: CPT | Mod: 25

## 2024-10-12 LAB
ANION GAP SERPL CALC-SCNC: 15 MMOL/L
BUN SERPL-MCNC: 11 MG/DL
CALCIUM SERPL-MCNC: 9 MG/DL
CHLORIDE SERPL-SCNC: 83 MMOL/L
CO2 SERPL-SCNC: 27 MMOL/L
CREAT SERPL-MCNC: 0.74 MG/DL
EGFR: 74 ML/MIN/1.73M2
GLUCOSE SERPL-MCNC: 98 MG/DL
NT-PROBNP SERPL-MCNC: 2380 PG/ML
POTASSIUM SERPL-SCNC: 3.4 MMOL/L
SODIUM SERPL-SCNC: 125 MMOL/L

## 2024-10-14 ENCOUNTER — NON-APPOINTMENT (OUTPATIENT)
Age: 89
End: 2024-10-14

## 2024-11-22 ENCOUNTER — APPOINTMENT (OUTPATIENT)
Dept: CARDIOLOGY | Facility: CLINIC | Age: 89
End: 2024-11-22

## 2024-11-25 ENCOUNTER — APPOINTMENT (OUTPATIENT)
Dept: NEPHROLOGY | Facility: CLINIC | Age: 89
End: 2024-11-25